# Patient Record
Sex: FEMALE
[De-identification: names, ages, dates, MRNs, and addresses within clinical notes are randomized per-mention and may not be internally consistent; named-entity substitution may affect disease eponyms.]

---

## 2018-09-29 ENCOUNTER — HOSPITAL ENCOUNTER (EMERGENCY)
Dept: HOSPITAL 97 - ER | Age: 33
Discharge: HOME | End: 2018-09-29
Payer: COMMERCIAL

## 2018-09-29 VITALS — TEMPERATURE: 98 F | DIASTOLIC BLOOD PRESSURE: 80 MMHG | OXYGEN SATURATION: 99 % | SYSTOLIC BLOOD PRESSURE: 106 MMHG

## 2018-09-29 DIAGNOSIS — M25.561: Primary | ICD-10-CM

## 2018-09-29 DIAGNOSIS — Z91.048: ICD-10-CM

## 2018-09-29 DIAGNOSIS — Z91.040: ICD-10-CM

## 2018-09-29 PROCEDURE — 99283 EMERGENCY DEPT VISIT LOW MDM: CPT

## 2018-09-29 PROCEDURE — 96372 THER/PROPH/DIAG INJ SC/IM: CPT

## 2018-09-29 NOTE — ER
Nurse's Notes                                                                                     

 [facility]                                                                

Name: Angelita Roach                                                                          

Age: 33 yrs                                                                                       

Sex: Female                                                                                       

: 1985                                                                                   

MRN: [id number]                                                                                   

Arrival Date: 2018                                                                          

Time: 19:17                                                                                       

Account#: [id number]                                                                            

Bed 13                                                                                            

Private MD:                                                                                       

Diagnosis: Pain in right knee                                                                     

                                                                                                  

Presentation:                                                                                     

                                                                                             

19:26 Presenting complaint: Patient states: Right knee pain for 2 weeks. Seen by PCP on       aj  

      Tuesday and had X ray and labs done. Appointment with ortho on 10/11. Transition of         

      care: patient was not received from another setting of care. Onset of symptoms was          

      September 15, 2018. Risk Assessment: Do you want to hurt yourself or someone else?          

      Patient reports no desire to harm self or others. Initial Sepsis Screen: Does the           

      patient meet any 2 criteria? No. Patient's initial sepsis screen is negative. Does the      

      patient have a suspected source of infection? No. Patient's initial sepsis screen is        

      negative. Care prior to arrival: None.                                                      

19:26 Method Of Arrival: Ambulatory                                                             

19:26 Acuity: MARI 4                                                                             

                                                                                                  

Triage Assessment:                                                                                

19:27 General: Appears in no apparent distress. uncomfortable, Behavior is calm, cooperative, aj  

      appropriate for age. Pain: Complains of pain in right knee. Neuro: Level of                 

      Consciousness is awake, alert, obeys commands, Oriented to person, place, time,             

      situation, Appropriate for age. Respiratory: Airway is patent Respiratory effort is         

      even, unlabored, Respiratory pattern is regular, symmetrical. Derm: Skin is intact, is      

      healthy with good turgor, Skin is pink, warm \T\ dry. normal. Musculoskeletal: Reports      

      pain in right knee.                                                                         

                                                                                                  

OB/GYN:                                                                                           

19:27 LMP 2018                                                                              

                                                                                                  

Historical:                                                                                       

- Allergies:                                                                                      

19:27 Latex, Natural Rubber;                                                                  aj  

- PMHx:                                                                                           

19:27 Anxiety; narcolepsy;                                                                    aj  

- PSHx:                                                                                           

19:27 ;                                                                              aj  

                                                                                                  

- Immunization history:: Adult Immunizations up to date.                                          

- Social history:: Smoking status: Patient/guardian denies using tobacco.                         

- Ebola Screening: : Patient negative for fever greater than or equal to 101.5 degrees            

  Fahrenheit, and additional compatible Ebola Virus Disease symptoms Patient denies               

  exposure to infectious person Patient denies travel to an Ebola-affected area in the            

  21 days before illness onset No symptoms or risks identified at this time.                      

                                                                                                  

                                                                                                  

Screenin:50 Abuse screen: Denies threats or abuse. Nutritional screening: No deficits noted.        ea  

      Tuberculosis screening: No symptoms or risk factors identified. Fall Risk None              

      identified.                                                                                 

                                                                                                  

Assessment:                                                                                       

19:50 General: Appears uncomfortable, Behavior is calm, cooperative, appropriate for age.     ea  

      Pain: Complains of pain in right knee Pain currently is 8 out of 10 on a pain scale.        

      Quality of pain is described as aching. Neuro: Level of Consciousness is awake, alert,      

      obeys commands, Oriented to person, place, time, situation. Cardiovascular: Patient's       

      skin is warm and dry. Respiratory: Airway is patent Respiratory effort is even,             

      unlabored, Respiratory pattern is regular, symmetrical. GI: No signs and/or symptoms        

      were reported involving the gastrointestinal system. : No signs and/or symptoms were      

      reported regarding the genitourinary system. Derm: Skin is pink, warm \T\ dry.              

20:50 Reassessment: Patient and/or family updated on plan of care and expected duration. Pain ea  

      level reassessed. Patient is alert, oriented x 3, equal unlabored respirations, skin        

      warm/dry/pink. Discharge instructions given to patient, verbalized the understanding of     

      instruction. Patient states symptoms have improved.                                         

                                                                                                  

Vital Signs:                                                                                      

19:27  / 68; Pulse 87; Resp 19; Temp 98.7; Pulse Ox 100% on R/A; Weight 74.84 kg;       aj  

      Height 5 ft. 2 in. (157.48 cm);                                                             

20:41  / 80; Pulse 75; Resp 18; Temp 98(O); Pulse Ox 99% on R/A;                        ea  

19:27 Body Mass Index 30.18 (74.84 kg, 157.48 cm)                                               

                                                                                                  

ED Course:                                                                                        

19:17 Patient arrived in ED.                                                                  am2 

19:27 Triage completed.                                                                       aj  

19:27 Arm band placed on left wrist. Patient placed in an exam room.                          aj  

19:33 Benja Silverio PA is PHCP.                                                                cp  

19:34 Benja Gilbert MD is Attending Physician.                                             rudy 

19:34 Benja Gilbert MD is Attending Physician.                                             cp  

19:43 Isabella Galvez, RN is Primary Nurse.                                                    ea  

20:32 Patient has correct armband on for positive identification. Bed in low position. Call   ea  

      light in reach. Side rails up X2.                                                           

20:38 Aaron Hicks MD is Referral Physician.                                               cp  

20:41 No provider procedures requiring assistance completed. Patient did not have IV access   ea  

      during this emergency room visit.                                                           

                                                                                                  

Administered Medications:                                                                         

20:00 Drug: TORadol 60 mg Route: IM; Site: right deltoid;                                     ea  

20:41 Follow up: Response: No adverse reaction; Pain is decreased                             ea  

                                                                                                  

                                                                                                  

Outcome:                                                                                          

20:38 Discharge ordered by MD.                                                                cp  

20:51 Discharged to home with crutches, with significant other.                               ea  

20:51 Condition: improved                                                                         

20:51 Discharge instructions given to patient, Instructed on discharge instructions, follow       

      up and referral plans. medication usage, Demonstrated understanding of instructions,        

      follow-up care, medications, Prescriptions given X 2.                                       

20:52 Patient left the ED.                                                                    ea  

                                                                                                  

Signatures:                                                                                       

Sun Meyers, RN                       RN   Benja Aden MD MD cha Page, Corey, PA                         PA   Sun Abdalla                               am2                                                  

Isabella Galvez RN                      RN   mary ann                                                   

                                                                                                  

**************************************************************************************************

## 2018-09-29 NOTE — EDPHYS
Physician Documentation                                                                           

 [facility]                                                                

Name: Angelita Roach                                                                          

Age: 33 yrs                                                                                       

Sex: Female                                                                                       

: 1985                                                                                   

MRN: [id number]                                                                                   

Arrival Date: 2018                                                                          

Time: 19:17                                                                                       

Account#: [id number]                                                                            

Bed 13                                                                                            

Private MD:                                                                                       

ERNESTINA Physician Benja Gilbert                                                                      

HPI:                                                                                              

                                                                                             

19:45 This 33 yrs old  Female presents to ER via Ambulatory with complaints of Knee   cp  

      Pain.                                                                                       

19:45 The patient presents with pain, that is acute.                                          cp  

19:45 The complaints affect the medial aspect of right knee. Context: resulted from an        cp  

      unknown cause, the patient can fully bear weight, the patient is able to ambulate, with     

      moderate difficulty. Onset: The symptoms/episode began/occurred 2 week(s) ago.              

      Associated signs and symptoms: Pertinent negatives calf tenderness, fever, numbness,        

      warmth, shortness of breath. Treatment prior to arrival includes: over the counter          

      medications, NSAIDS. The patient has been recently seen by a physician: Dr. Luis with       

      similar presenting complaints, lab tests were done, X-rays were performed, and was          

      referred to a specialist.                                                                   

                                                                                                  

OB/GYN:                                                                                           

19:27 LMP 2018                                                                            aj  

                                                                                                  

Historical:                                                                                       

- Allergies:                                                                                      

19:27 Latex, Natural Rubber;                                                                  aj  

- PMHx:                                                                                           

19:27 Anxiety; narcolepsy;                                                                    aj  

- PSHx:                                                                                           

19:27 ;                                                                              aj  

                                                                                                  

- Immunization history:: Adult Immunizations up to date.                                          

- Social history:: Smoking status: Patient/guardian denies using tobacco.                         

- Ebola Screening: : Patient negative for fever greater than or equal to 101.5 degrees            

  Fahrenheit, and additional compatible Ebola Virus Disease symptoms Patient denies               

  exposure to infectious person Patient denies travel to an Ebola-affected area in the            

  21 days before illness onset No symptoms or risks identified at this time.                      

                                                                                                  

                                                                                                  

ROS:                                                                                              

19:50 Constitutional: Negative for body aches, chills, fever, poor PO intake.                 cp  

19:50 Eyes: Negative for injury, pain, redness, and discharge.                                cp  

19:50 ENT: Negative for drainage from ear(s), ear pain, sore throat, difficulty swallowing,       

      difficulty handling secretions.                                                             

19:50 Cardiovascular: Negative for chest pain, edema, palpitations.                               

19:50 Respiratory: Negative for cough, shortness of breath, wheezing.                             

19:50 Abdomen/GI: Negative for abdominal pain, nausea, vomiting, and diarrhea.                    

19:50 MS/extremity: Positive for pain, tenderness, of the medial aspect right knee, Negative      

      for injury or acute deformity, decreased range of motion, paresthesias.                     

19:50 Skin: Negative for cellulitis, rash.                                                        

19:50 All other systems are negative.                                                             

                                                                                                  

Exam:                                                                                             

19:57 Constitutional: The patient appears in no acute distress, alert, awake, non-toxic, well cp  

      developed, well nourished.                                                                  

19:57 Head/Face:  Normocephalic, atraumatic.                                                  cp  

19:57 Eyes: Periorbital structures: appear normal, Conjunctiva: normal, no exudate, no            

      injection, Sclera: no appreciated abnormality, Lids and lashes: appear normal,              

      bilaterally.                                                                                

19:57 ENT: External ear(s): are unremarkable, Nose: is normal, Mouth: is normal, Posterior        

      pharynx: is normal, airway is patent.                                                       

19:57 Neck: ROM/movement: is normal, is supple, without pain, no range of motions                 

      limitations, no nuchal rigidity.                                                            

19:57 Chest/axilla: Inspection: normal.                                                           

19:57 Cardiovascular: Rate: normal.                                                               

19:57 Respiratory: the patient does not display signs of respiratory distress,  Respirations:     

      normal, no use of accessory muscles, no retractions, no splinting, no tachypnea,            

      labored breathing, is not present.                                                          

19:57 Abdomen/GI: Exam negative for discomfort, distension, guarding, Inspection: abdomen         

      appears normal.                                                                             

19:57 Back: pain, is absent, ROM is normal.                                                       

19:57 Musculoskeletal/extremity: Extremities: grossly normal except: noted in the medial          

      aspect right knee distal MCL: pain, swelling, tenderness, There is no evidence of           

      decreased ROM.                                                                              

19:57 Skin: cellulitis, is not appreciated, no rash present.                                      

                                                                                                  

Vital Signs:                                                                                      

19:27  / 68; Pulse 87; Resp 19; Temp 98.7; Pulse Ox 100% on R/A; Weight 74.84 kg;       aj  

      Height 5 ft. 2 in. (157.48 cm);                                                             

20:41  / 80; Pulse 75; Resp 18; Temp 98(O); Pulse Ox 99% on R/A;                        ea  

19:27 Body Mass Index 30.18 (74.84 kg, 157.48 cm)                                               

                                                                                                  

Procedures:                                                                                       

20:40 Crutch training provided to patient and/or family. Return demonstration given.          cp  

20:40 Splinting: Splint applied to right knee using knee immobilizer, applied by nurse.       cp  

      Examined by me, post splint application: neurovascular intact, Patient tolerated well.      

                                                                                                  

MDM:                                                                                              

19:34 Patient medically screened.                                                             [facility] 

20:00 Differential diagnosis: dislocation, closed fracture, tendonitis, septic joint,         cp  

      cellulitis, DVT.                                                                            

20:37 Data reviewed: vital signs, nurses notes.                                               cp  

20:37 Refusal of service: The patient/guardian displays adequate decision making capability   cp  

      and despite a detailed discussion of alternatives, benefits, risks, and consequences        

      refuses: US to r/o DVT. ED course: VSS. Recommend RICE and f/u with ortho.                  

                                                                                                  

                                                                                             

19:57 Order name: Knee Immobilizer; Complete Time: 20:13                                      cp  

                                                                                             

19:57 Order name: Crutches; Complete Time: 20:13                                              cp  

                                                                                                  

Administered Medications:                                                                         

20:00 Drug: TORadol 60 mg Route: IM; Site: right deltoid;                                     ea  

20:41 Follow up: Response: No adverse reaction; Pain is decreased                             ea  

                                                                                                  

                                                                                                  

Disposition:                                                                                      

                                                                                             

07:18 Co-signature as Attending Physician, Benja Gilbert MD I agree with the assessment and  [facility] 

      plan of care.                                                                               

                                                                                                  

Disposition:                                                                                      

18 20:38 Discharged to Home. Impression: Pain in right knee.                                

- Condition is Stable.                                                                            

- Discharge Instructions: Knee Immobilizer, Knee Pain.                                            

- Prescriptions for Anaprox  mg Oral Tablet - take 1 tablet by ORAL route every             

  12 hours As needed; 20 tablet. Tramadol 50 mg Oral Tablet - take 1 tablet by ORAL               

  route every 8 hours as needed; 12 tablet.                                                       

- Medication Reconciliation Form, Thank You Letter, Antibiotic Education, Prescription            

  Opioid Use, Work release form form.                                                             

- Follow up: Aaron Hicks MD; When: 2 - 3 days; Reason: Recheck today's complaints.            

- Problem is an ongoing problem.                                                                  

- Symptoms have improved.                                                                         

                                                                                                  

                                                                                                  

                                                                                                  

Signatures:                                                                                       

Sun Meyers RN RN aj Anderson, Corey, MD MD cha Page, Corey, PA PA cp Antunez, Elena, RN RN ea                                                   

                                                                                                  

Corrections: (The following items were deleted from the chart)                                    

                                                                                             

20:52 20:38 2018 20:38 Discharged to Home. Impression: Pain in right knee. Condition is ea  

      Stable. Forms are Medication Reconciliation Form, Thank You Letter, Antibiotic              

      Education, Prescription Opioid Use. Follow up: Aaron Hicks; When: 2 - 3 days; Reason:     

      Recheck today's complaints. Problem is an ongoing problem. Symptoms have improved. cp       

                                                                                                  

**************************************************************************************************

## 2020-07-13 ENCOUNTER — HOSPITAL ENCOUNTER (OUTPATIENT)
Dept: HOSPITAL 97 - ER | Age: 35
Setting detail: OBSERVATION
LOS: 1 days | Discharge: HOME | End: 2020-07-14
Attending: INTERNAL MEDICINE | Admitting: INTERNAL MEDICINE
Payer: COMMERCIAL

## 2020-07-13 VITALS — BODY MASS INDEX: 32.9 KG/M2

## 2020-07-13 DIAGNOSIS — K42.9: ICD-10-CM

## 2020-07-13 DIAGNOSIS — K35.80: Primary | ICD-10-CM

## 2020-07-13 DIAGNOSIS — N83.11: ICD-10-CM

## 2020-07-13 DIAGNOSIS — E83.51: ICD-10-CM

## 2020-07-13 DIAGNOSIS — Z11.59: ICD-10-CM

## 2020-07-13 DIAGNOSIS — D64.9: ICD-10-CM

## 2020-07-13 LAB
ALBUMIN SERPL BCP-MCNC: 3.4 G/DL (ref 3.4–5)
ALP SERPL-CCNC: 111 U/L (ref 45–117)
ALT SERPL W P-5'-P-CCNC: 25 U/L (ref 12–78)
AST SERPL W P-5'-P-CCNC: 14 U/L (ref 15–37)
BUN BLD-MCNC: 10 MG/DL (ref 7–18)
GLUCOSE SERPLBLD-MCNC: 93 MG/DL (ref 74–106)
HCT VFR BLD CALC: 35.9 % (ref 36–45)
LIPASE SERPL-CCNC: 61 U/L (ref 73–393)
LYMPHOCYTES # SPEC AUTO: 1.9 K/UL (ref 0.7–4.9)
PMV BLD: 8.8 FL (ref 7.6–11.3)
POTASSIUM SERPL-SCNC: 4 MMOL/L (ref 3.5–5.1)
RBC # BLD: 4.21 M/UL (ref 3.86–4.86)

## 2020-07-13 PROCEDURE — 36415 COLL VENOUS BLD VENIPUNCTURE: CPT

## 2020-07-13 PROCEDURE — 80076 HEPATIC FUNCTION PANEL: CPT

## 2020-07-13 PROCEDURE — 80048 BASIC METABOLIC PNL TOTAL CA: CPT

## 2020-07-13 PROCEDURE — 83690 ASSAY OF LIPASE: CPT

## 2020-07-13 PROCEDURE — 96375 TX/PRO/DX INJ NEW DRUG ADDON: CPT

## 2020-07-13 PROCEDURE — 81003 URINALYSIS AUTO W/O SCOPE: CPT

## 2020-07-13 PROCEDURE — 88304 TISSUE EXAM BY PATHOLOGIST: CPT

## 2020-07-13 PROCEDURE — 74177 CT ABD & PELVIS W/CONTRAST: CPT

## 2020-07-13 PROCEDURE — 99285 EMERGENCY DEPT VISIT HI MDM: CPT

## 2020-07-13 PROCEDURE — 85025 COMPLETE CBC W/AUTO DIFF WBC: CPT

## 2020-07-13 PROCEDURE — 96365 THER/PROPH/DIAG IV INF INIT: CPT

## 2020-07-13 PROCEDURE — 96361 HYDRATE IV INFUSION ADD-ON: CPT

## 2020-07-13 PROCEDURE — 44970 LAPAROSCOPY APPENDECTOMY: CPT

## 2020-07-13 PROCEDURE — 81025 URINE PREGNANCY TEST: CPT

## 2020-07-13 NOTE — EDPHYS
Physician Documentation                                                                           

 [facility]                                                                 

Name: Angelita Roach                                                                          

Age: 34 yrs                                                                                       

Sex: Female                                                                                       

: 1985                                                                                   

MRN: [id number]                                                                                   

Arrival Date: 2020                                                                          

Time: 18:06                                                                                       

Account#: [id number]                                                                            

Bed 26                                                                                            

Private MD:                                                                                       

ED Physician Roberto Carlos Garcia                                                                      

HPI:                                                                                              

                                                                                             

21:05 This 34 yrs old  Female presents to ER via Ambulatory with complaints of        mh7 

      Abdominal Pain.                                                                             

21:05 The patient presents with abdominal pain in the left lower quadrant. Onset: The         mh7 

      symptoms/episode began/occurred yesterday. The symptoms radiate to the left flank.          

      Associated signs and symptoms: Pertinent positives: nausea and vomiting, Pertinent          

      negatives: anorexia, blood in stools, chest pain, constipation, diarrhea, dysuria,          

      fever, headache, hematuria, palpitations, shortness of breath, vaginal discharge,           

      vomiting blood. The symptoms are described as intermittent, vague, waxing/waning.           

      Modifying factors: The symptoms are alleviated by nothing, the symptoms are aggravated      

      by nothing. Severity of pain: At its worst the pain was moderate today, in the              

      emergency department the pain has improved moderately.                                      

                                                                                                  

OB/GYN:                                                                                           

20:35 LMP N/A - Birth control method                                                          vc  

                                                                                                  

Historical:                                                                                       

- Allergies:                                                                                      

18:34 Latex, Natural Rubber;                                                                  ll1 

- PMHx:                                                                                           

18:34 narcolepsy; Anxiety;                                                                    ll1 

- PSHx:                                                                                           

18:34 ;                                                                              ll1 

                                                                                                  

- Immunization history:: Flu vaccine is up to date.                                               

- Social history:: Smoking status: Patient denies any tobacco usage or history of.                

  Patient/guardian denies using alcohol, street drugs, tobacco products.                          

                                                                                                  

                                                                                                  

ROS:                                                                                              

21:05 Constitutional: Negative for fever, chills, and weight loss, Eyes: Negative for injury, mh7 

      pain, redness, and discharge, ENT: Negative for injury, pain, and discharge, Neck:          

      Negative for injury, pain, and swelling, Cardiovascular: Negative for chest pain,           

      palpitations, and edema, Respiratory: Negative for shortness of breath, cough,              

      wheezing, and pleuritic chest pain, Back: Negative for injury and pain, : Negative        

      for injury, bleeding, discharge, and swelling, MS/Extremity: Negative for injury and        

      deformity, Skin: Negative for injury, rash, and discoloration, Neuro: Negative for          

      headache, weakness, numbness, tingling, and seizure, Psych: Negative for depression,        

      anxiety, suicide ideation, homicidal ideation, and hallucinations, Allergy/Immunology:      

      Negative for hives, rash, and allergies, Endocrine: Negative for neck swelling,             

      polydipsia, polyuria, polyphagia, and marked weight changes, Hematologic/Lymphatic:         

      Negative for swollen nodes, abnormal bleeding, and unusual bruising.                        

                                                                                                  

Exam:                                                                                             

21:05 Constitutional:  This is a well developed, well nourished patient who is awake, alert,  mh7 

      and in no acute distress. Head/Face:  Normocephalic, atraumatic. Neck:  Trachea             

      midline, no thyromegaly or masses palpated, and no cervical lymphadenopathy.  Supple,       

      full range of motion without nuchal rigidity, or vertebral point tenderness.  No            

      Meningismus. Chest/axilla:  Normal chest wall appearance and motion.  Nontender with no     

      deformity.  No lesions are appreciated. Cardiovascular:  Regular rate and rhythm with a     

      normal S1 and S2.  No gallops, murmurs, or rubs.  Normal PMI, no JVD.  No pulse             

      deficits. Respiratory:  Lungs have equal breath sounds bilaterally, clear to                

      auscultation and percussion.  No rales, rhonchi or wheezes noted.  No increased work of     

      breathing, no retractions or nasal flaring.                                                 

21:05 Back:  No spinal tenderness.  No costovertebral tenderness.  Full range of motion.          

      Skin:  Warm, dry with normal turgor.  Normal color with no rashes, no lesions, and no       

      evidence of cellulitis. MS/ Extremity:  Pulses equal, no cyanosis.  Neurovascular           

      intact.  Full, normal range of motion. Neuro:  Awake and alert, GCS 15, oriented to         

      person, place, time, and situation.  Cranial nerves II-XII grossly intact.  Motor           

      strength 5/5 in all extremities.  Sensory grossly intact.  Cerebellar exam normal.          

      Normal gait. Psych:  Awake, alert, with orientation to person, place and time.              

      Behavior, mood, and affect are within normal limits.                                        

21:05 Abdomen/GI: Inspection: abdomen appears normal, Bowel sounds: normal, in all quadrants,     

      Palpation: moderate abdominal tenderness, in the epigastric area and left lower             

      quadrant, Rectal exam: the exam is deferred, because of patient request, Indicators:        

      McBurney's point is not tender, Stephenson's sign is negative, Rovsing's sign is negative,      

      Obturator sign is negative, Psoas sign is negative, Liver: no appreciated palpable          

      abnormalities, Hernia: not appreciated.                                                     

                                                                                                  

Vital Signs:                                                                                      

18:32  / 52; Pulse 95; Resp 17; Temp 98.7; Pulse Ox 100% ; Pain 5/10;                   ll1 

21:00  / 74; Pulse 88; Resp 17; Pulse Ox 100% on R/A;                                   vc  

22:00  / 74; Pulse 81; Resp 16; Pulse Ox 100% on R/A;                                   vc  

23:00  / 70; Pulse 81; Resp 18; Pulse Ox 100% on R/A;                                   vc  

                                                                                                  

MDM:                                                                                              

20:24 Patient medically screened.                                                             [facility] 

22:43 Differential diagnosis: appendicitis, non-specific abd pain, Ureterolithiasis, urinary  mh7 

      tract infection. Data reviewed: vital signs, nurses notes, lab test result(s), CBC,         

      electrolytes, urinalysis. Data interpreted: Cardiac monitor: rate is 95 beats/min,          

      rhythm is normal sinus rhythm, regular, Interpretation: normal rate, normal rhythm,         

      Pulse oximetry: on room air is 100 %. Interpretation: normal. Counseling: I had a           

      detailed discussion with the patient and/or guardian regarding: the historical points,      

      exam findings, and any diagnostic results supporting the discharge/admit diagnosis, lab     

      results, radiology results. Response to treatment: the patient's symptoms have markedly     

      improved after treatment.                                                                   

                                                                                                  

                                                                                             

20:26 Order name: Basic Metabolic Panel; Complete Time: 21:17                                 [facility] 

                                                                                             

20:26 Order name: CBC with Diff; Complete Time: 21:17                                         [facility] 

                                                                                             

20:26 Order name: Hepatic Function; Complete Time: 21:                                      [facility] 

                                                                                             

20:26 Order name: Lipase; Complete Time: 21:17                                                [facility] 

                                                                                             

22:00 Order name: Urine Dipstick--Ancillary (enter results); Complete Time: 22:14             [facility] 

                                                                                             

22:00 Order name: Urine Pregnancy--Ancillary (enter results); Complete Time: 22:14            [facility] 

                                                                                             

21:18 Order name: CT Abd/Pelvis - IV Contrast Only                                            [facility] 

                                                                                             

05:15 Order name: CBC with Automated Diff; Complete Time: 08:05                               [facility]

                                                                                             

05:34 Order name: Basic Metabolic Panel; Complete Time: 08:05                                 [facility]

                                                                                             

05:34 Order name: Lipase; Complete Time: 08:05                                                [facility]

                                                                                             

08:16 Order name: Lipase                                                                      [facility] 

                                                                                             

09:02 Order name: CORONAVIRUS                                                                 [facility]

                                                                                             

09:07 Order name: Lipase                                                                      [facility]

                                                                                             

20:26 Order name: IV Saline Lock; Complete Time: 20:53                                        [facility] 

                                                                                             

20:26 Order name: Labs collected and sent; Complete Time: 20:53                               [facility] 

                                                                                             

20:26 Order name: Urine Dipstick-Ancillary (obtain specimen); Complete Time: 22:06            [facility] 

                                                                                             

20:26 Order name: Urine Pregnancy Test (obtain specimen); Complete Time: 22:06                7 

                                                                                                  

Administered Medications:                                                                         

20:52 Drug: Zofran (Ondansetron) 4 mg Route: IVP; Site: left antecubital;                     vc  

22:30 Follow up: Response: No adverse reaction                                                vc  

20:52 Drug: NS 0.9% 1000 ml Route: IV; Rate: 1000 ml; Site: left antecubital;                 vc  

22:00 Follow up: IV Status: Completed infusion; IV Intake: 1000ml                             vc  

20:52 Drug: Pepcid 20 mg Route: IVP; Site: left antecubital;                                  vc  

22:30 Follow up: Response: No adverse reaction                                                vc  

20:53 Drug: morphine 4 mg Route: IVP; Site: left antecubital;                                 vc  

22:30 Follow up: Response: No adverse reaction                                                vc  

23:10 Drug: Zosyn 3.375 grams Route: IVPB; Infused Over: 60 mins; Site: left antecubital;     vc  

23:30 Follow up: IV Status: Completed infusion; IV Intake: 100ml                              vc  

                                                                                                  

                                                                                                  

Disposition:                                                                                      

20 22:46 Hospitalization ordered by KERVIN Luis for Inpatient Admission. Preliminary            

  diagnosis is Acut Appendicitis.                                                                 

- Bed requested for [facility] ER HOLD.                                                                 

- Status is Inpatient Admission.                                                              ss  

- Condition is Stable.                                                                            

- Problem is new.                                                                                 

- Symptoms have improved.                                                                         

                                                                                                  

                                                                                                  

                                                                                                  

Signatures:                                                                                       

Dispatcher [employer]                           Benja Lynch MD MD cha Smirch, Shelby, RN RN ss Garcia, Cindy, RN RN   cg                                                   

Lucita Lester RN RN vc Lewis, Lynsay, RN RN   ll1                                                  

Roberto Carlos Garcia MD MD   7                                                  

                                                                                                  

Corrections: (The following items were deleted from the chart)                                    

23:08 22:46 Hospitalization Ordered by KERVIN Luis MD for Inpatient Admission. Preliminary         cg  

      diagnosis is Acut Appendicitis. Bed requested for Telemetry/MedSurg (Inpatient). Status     

      is Inpatient Admission. Condition is Stable. Problem is new. Symptoms have improved. mh7    

                                                                                             

09:58  23:08 2020 22:46 Hospitalization Ordered by A Toby COELHO for Inpatient         ss  

      Admission. Preliminary diagnosis is Acut Appendicitis. Bed requested for [facility] ER HOLD.      

      Status is Inpatient Admission. Condition is Stable. Problem is new. Symptoms have           

      improved. cg                                                                                

                                                                                             

10:04 09:58 2020 22:46 Hospitalization Ordered by A Toby COELHO for Inpatient Admission.    ss  

      Preliminary diagnosis is Acut Appendicitis. Bed requested for [facility] ER HOLD. Status is       

      Inpatient Admission. Condition is Stable. Problem is new. Symptoms have improved. ss        

12:14 10:04 2020 22:46 Hospitalization Ordered by A Toby COELHO for Inpatient Admission.    ss  

      Preliminary diagnosis is Acut Appendicitis. Bed requested for [facility] ER HOLD. Status is       

      Inpatient Admission. Condition is Stable. Problem is new. Symptoms have improved. ss        

                                                                                                  

**************************************************************************************************

## 2020-07-13 NOTE — ER
Nurse's Notes                                                                                     

 [facility]                                                                 

Name: Angelita Roach                                                                          

Age: 34 yrs                                                                                       

Sex: Female                                                                                       

: 1985                                                                                   

MRN: [id number]                                                                                   

Arrival Date: 2020                                                                          

Time: 18:06                                                                                       

Account#: [id number]                                                                            

Bed 26                                                                                            

Private MD:                                                                                       

Diagnosis: Acut Appendicitis                                                                      

                                                                                                  

Presentation:                                                                                     

                                                                                             

18:32 Chief complaint: Patient states: Lower abdominal pain since yesterday. 1 vomitus        ll1 

      yesterday, none today. Fever 100 last night. No vaginal bleeding or discharge.              

      Coronavirus screen: Proceed with normal triage. Patient denies a cough. Patient denies      

      shortness of breath or difficulty breathing. Patient reports a measured and/or              

      subjective temperature greater than 100.4F. Patient denies travel on a cruise ship or       

      to a country the [facility] currently lists as an affected area. Patient denies contact with       

      known and/or suspected case of COVID-19. Ebola Screen: Patient denies travel to an          

      Ebola-affected area in the 21 days before illness onset. Initial Sepsis Screen: Does        

      the patient meet any 2 criteria? HR > 90 bpm. No. Patient's initial sepsis screen is        

      negative. Risk Assessment: Do you want to hurt yourself or someone else? Patient            

      reports no desire to harm self or others. Onset of symptoms was 2020.              

18:32 Method Of Arrival: Ambulatory                                                           ll1 

18:32 Acuity: MARI 3                                                                           ll1 

20:00 Initial Sepsis Screen: Does the patient have a suspected source of infection? No.       vc  

      Patient's initial sepsis screen is negative.                                                

                                                                                                  

OB/GYN:                                                                                           

20:35 LMP N/A - Birth control method                                                          vc  

                                                                                                  

Historical:                                                                                       

- Allergies:                                                                                      

18:34 Latex, Natural Rubber;                                                                  ll1 

- PMHx:                                                                                           

18:34 narcolepsy; Anxiety;                                                                    ll1 

- PSHx:                                                                                           

18:34 ;                                                                              ll1 

                                                                                                  

- Immunization history:: Flu vaccine is up to date.                                               

- Social history:: Smoking status: Patient denies any tobacco usage or history of.                

  Patient/guardian denies using alcohol, street drugs, tobacco products.                          

                                                                                                  

                                                                                                  

Screenin:00 Abuse screen: Denies threats or abuse. Nutritional screening: No deficits noted.        vc  

      Tuberculosis screening: No symptoms or risk factors identified. Fall Risk None              

      identified.                                                                                 

                                                                                                  

Assessment:                                                                                       

20:00 General: Appears in no apparent distress. uncomfortable, Behavior is calm, cooperative, vc  

      appropriate for age. Pain: Complains of pain in epigastric area and left lower quadrant     

      Pain radiates to back Pain currently is 5 out of 10 on a pain scale. Quality of pain is     

      described as sharp, shooting, stabbing. Neuro: Level of Consciousness is awake, alert,      

      obeys commands, Oriented to person, place, time, situation, Appropriate for age.            

      Cardiovascular: Capillary refill < 3 seconds Patient's skin is warm and dry.                

      Respiratory: Respiratory effort is even, unlabored, Respiratory pattern is regular,         

      symmetrical. GI: Bowel sounds present X 4 quads. Abd is soft Abdomen is tender to           

      palpation X 4 quads. : No signs and/or symptoms were reported regarding the               

      genitourinary system. Derm: Skin is intact, is healthy with good turgor, Skin               

      temperature is warm. Musculoskeletal: Circulation, motion, and sensation intact. Range      

      of motion: intact in all extremities.                                                       

21:00 Reassessment: Patient appears in no apparent distress at this time. Patient and/or      vc  

      family updated on plan of care and expected duration. Pain level reassessed. Patient is     

      alert, oriented x 3, equal unlabored respirations, skin warm/dry/pink.                      

22:00 Reassessment: Patient appears in no apparent distress at this time. Patient and/or      vc  

      family updated on plan of care and expected duration. Pain level reassessed. Patient is     

      alert, oriented x 3, equal unlabored respirations, skin warm/dry/pink.                      

23:00 Reassessment: Patient appears in no apparent distress at this time. Patient and/or      vc  

      family updated on plan of care and expected duration. Pain level reassessed. Patient is     

      alert, oriented x 3, equal unlabored respirations, skin warm/dry/pink.                      

23:45 Reassessment: Patient appears in no apparent distress at this time. Patient and/or      vc  

      family updated on plan of care and expected duration. Pain level reassessed. Patient is     

      alert, oriented x 3, equal unlabored respirations, skin warm/dry/pink. Please see           

      Scheduling Employee Scheduling Software for further documentation. Patient states symptoms have improved.                  

                                                                                             

09:45 Reassessment: Pt left to go to OR.                                                        

                                                                                                  

Vital Signs:                                                                                      

                                                                                             

18:32  / 52; Pulse 95; Resp 17; Temp 98.7; Pulse Ox 100% ; Pain 5/10;                   ll1 

21:00  / 74; Pulse 88; Resp 17; Pulse Ox 100% on R/A;                                   vc  

22:00  / 74; Pulse 81; Resp 16; Pulse Ox 100% on R/A;                                   vc  

23:00  / 70; Pulse 81; Resp 18; Pulse Ox 100% on R/A;                                   vc  

                                                                                                  

ED Course:                                                                                        

18:06 Patient arrived in ED.                                                                  fj1 

18:34 Triage completed.                                                                       ll1 

18:34 Arm band placed on Patient notified of wait time.                                       ll1 

19:59 Roberto Carlos Garcia MD is Attending Physician.                                             mh7 

20:00 Patient has correct armband on for positive identification. Pulse ox on. NIBP on.       vc  

20:11 Lucita Lester, LATISHA is Primary Nurse.                                                  vc  

21:46 CT Abd/Pelvis - IV Contrast Only In Process Unspecified.                                EDMS

21:50 Urine collected: clean catch specimen, clear, kale colored.                            jp3 

22:45 KERVIN Luis MD is Hospitalizing Provider.                                                  7 

23:45 No provider procedures requiring assistance completed. Patient admitted, IV remains in  vc  

      place.                                                                                      

                                                                                                  

Administered Medications:                                                                         

20:52 Drug: Zofran (Ondansetron) 4 mg Route: IVP; Site: left antecubital;                     vc  

22:30 Follow up: Response: No adverse reaction                                                vc  

20:52 Drug: NS 0.9% 1000 ml Route: IV; Rate: 1000 ml; Site: left antecubital;                 vc  

22:00 Follow up: IV Status: Completed infusion; IV Intake: 1000ml                             vc  

20:52 Drug: Pepcid 20 mg Route: IVP; Site: left antecubital;                                  vc  

22:30 Follow up: Response: No adverse reaction                                                vc  

20:53 Drug: morphine 4 mg Route: IVP; Site: left antecubital;                                 vc  

22:30 Follow up: Response: No adverse reaction                                                vc  

23:10 Drug: Zosyn 3.375 grams Route: IVPB; Infused Over: 60 mins; Site: left antecubital;     vc  

23:30 Follow up: IV Status: Completed infusion; IV Intake: 100ml                              vc  

                                                                                                  

                                                                                                  

Intake:                                                                                           

22:00 IV: 1000ml; Total: 1000ml.                                                              vc  

23:30 IV: 100ml; Total: 1100ml.                                                               vc  

                                                                                                  

Outcome:                                                                                          

22:46 Decision to Hospitalize by Provider.                                                    7 

23:45 Admitted to ER Hold.  Please see [facility] for further documentation.                    vc  

23:45 Condition: good                                                                             

23:45 Instructed on the need for admit.                                                           

                                                                                             

12:14 Patient left the ED.                                                                      

                                                                                                  

Signatures:                                                                                       

Dispatcher [employer]                           EDMabel Patterson, RN                      RN                                                      

Romaine Epps jp3                                                  

Lucita Lester RN                    RN   Ronald Contreras1                                                  

Arianne Mabry RN RN ah Lewis, Lynsay, RN                       RN   ll1                                                  

Roberto Carlos Garcia MD MD   mh7                                                  

                                                                                                  

**************************************************************************************************

## 2020-07-14 VITALS — OXYGEN SATURATION: 100 %

## 2020-07-14 VITALS — TEMPERATURE: 97.9 F

## 2020-07-14 VITALS — SYSTOLIC BLOOD PRESSURE: 118 MMHG | DIASTOLIC BLOOD PRESSURE: 68 MMHG

## 2020-07-14 LAB
BUN BLD-MCNC: 9 MG/DL (ref 7–18)
GLUCOSE SERPLBLD-MCNC: 126 MG/DL (ref 74–106)
HCT VFR BLD CALC: 32.2 % (ref 36–45)
LIPASE SERPL-CCNC: 1744 U/L (ref 73–393)
LYMPHOCYTES # SPEC AUTO: 1.6 K/UL (ref 0.7–4.9)
PMV BLD: 8.8 FL (ref 7.6–11.3)
POTASSIUM SERPL-SCNC: 3.8 MMOL/L (ref 3.5–5.1)
RBC # BLD: 3.76 M/UL (ref 3.86–4.86)

## 2020-07-14 PROCEDURE — 0DTJ4ZZ RESECTION OF APPENDIX, PERCUTANEOUS ENDOSCOPIC APPROACH: ICD-10-PCS

## 2020-07-14 RX ADMIN — HYDROMORPHONE HYDROCHLORIDE ONE MG: 1 INJECTION, SOLUTION INTRAMUSCULAR; INTRAVENOUS; SUBCUTANEOUS at 11:13

## 2020-07-14 RX ADMIN — HYDROMORPHONE HYDROCHLORIDE ONE MG: 1 INJECTION, SOLUTION INTRAMUSCULAR; INTRAVENOUS; SUBCUTANEOUS at 11:55

## 2020-07-14 RX ADMIN — HYDROMORPHONE HYDROCHLORIDE ONE MG: 1 INJECTION, SOLUTION INTRAMUSCULAR; INTRAVENOUS; SUBCUTANEOUS at 11:18

## 2020-07-14 RX ADMIN — HYDROMORPHONE HYDROCHLORIDE ONE MG: 1 INJECTION, SOLUTION INTRAMUSCULAR; INTRAVENOUS; SUBCUTANEOUS at 11:50

## 2020-07-14 NOTE — PREOPCON
Date of Consultation:  07/13/2020



Reason For Consultation:  Abdominal pain.



History Of Present Illness:  The patient is a 34-year-old female, who presents with 2-day history of 
periumbilical abdominal pain and lower abdominal pain associated nausea and vomiting on Sunday, anore
claudio, low-grade temperature on Sunday.  No sore throat, runny nose, cough, headaches, or dizziness.  N
o chest pain.  No diarrhea or constipation.  No blood in her stool.  No dysuria or hematuria.  Last m
enstrual period was 2-3 weeks ago was normal.



Review of Systems:

Otherwise unremarkable.



Past Medical History:  Negative.



Past Surgical History:  Negative.



Allergies:  NO ALLERGY.



Social History:  The patient does not smoke or drink.



Family History:  Significant for diabetes and hypertension.



Physical Examination:

Vital Signs:  Stable.  She is afebrile. 

General:  She is awake, alert, and oriented x3. 

Head and Neck:  Cranial nerves 2 through 12 grossly within normal limits.  No neck masses.  No JVD.  
Throat clear.  Neck is supple. 

Chest:  Clear. 

Heart:  S1 and S2. 

Abdomen:  Soft, nondistended.  Positive bowel sounds.  Positive lower left tenderness and rebound.  N
o rigidity or guarding.  Most tender is just below the umbilicus to the left side. 

Extremities:  Adequately perfused.  Nontender. 

Neuro:  Nonfocal.



Laboratory Data:  White count is normal.  CT of the abdomen and pelvis reviewed with the radiologist.
  The patient does have abnormal location of the cecum at the midline with the appendix tilted toward
 the left side.  There is an acute inflammation and no abscess.  No perforation.  No free air.  There
 is also a 2.2 corpus luteum cyst on the ovary.



Assessment:  Acute appendicitis.



Plan:  Admit, n.p.o., IV fluid, IV antibiotic, to the OR for lap appy possible open.  The patient und
erstands the risks, benefits, alternatives and agrees to procedure.





AS/MODL

DD:  07/14/2020 09:29:31Voice ID:  [id number]

DT:  07/14/2020 10:34:27Report ID:  [id number]

## 2020-07-14 NOTE — OP
Date of Procedure:  07/14/2020



Surgeon:  Sarthak Paz MD



Preoperative Diagnosis:  Acute appendicitis.



Postoperative Diagnosis:  Acute appendicitis.



Procedure Performed:  Laparoscopic appendectomy.



Estimated Blood Loss:  Minimal.



Specimen:  Appendix.



Findings:  As above.



Anesthesia:  General.



Complications:  None.



Disposition:  The patient tolerated the procedure in stable condition, taken to Recovery in good gene
ral condition.



Procedure In Detail:  The patient was brought to the OR and placed in supine position.  General anest
hesia was begun.  The patient was prepped and draped in usual sterile fashion.  Marcaine 0.5% was inf
iltrated locally.  A 15 blade was used to make a 1 cm supraumbilical midline incision.  Subcutaneous 
tissue was divided.  Fascia was identified and divided.  A #1 Vicryl stay suture was placed.  Periton
eal cavity was entered with blunt dissection.  A 12 mm trocar was placed into the peritoneal cavity u
nder direct vision.  Pneumoperitoneum was established.  Then, two 5 mm trocars were placed, one in th
e suprapubic region, one in the left lower quadrant.  Laparoscopy revealed injected dilated appendix 
consistent with early acute appendicitis.  There was some serous fluid in the cul-de-sac, which was a
spirated.  Tubes, ovary, and uterus looked normal.  Remainder of the small bowel and large bowel appe
ared normal.  The gallbladder was slightly distended, liver was within normal limits, and peritoneal 
surfaces were otherwise normal.  Then, Endo-DENNIS stapling device was used to divide the base of the ap
pendix and the mesoappendix, and the appendix was retrieved through the umbilicus via an EndoCatch ba
g.  The cecum was somewhat toward the midline with the appendix leading toward the left side.  Other 
than that, everything else was within normal limits and the appendix was retrieved, sent to Pathology
.  Then, the entire peritoneal cavity was examined.  No evidence of bleeding or bowel injury apprecia
constanza.  All trocars were removed under direct vision.  Stay sutures were tied to each other across the 
fascial defect.  Subcutaneous wounds were irrigated.  Bleeding controlled with cautery.  3-0 chromic 
used to approximate the subcutaneous tissue and close the skin.  Sterile dressing was applied.  The p
atient was awakened and taken to Recovery in good general condition.





AS/MODL

DD:  07/14/2020 10:45:34Voice ID:  [id number]

DT:  07/14/2020 20:17:43Report ID:  [id number]

## 2020-07-15 NOTE — RAD REPORT
EXAM DESCRIPTION:

CT ABDOMEN PELVIS WITH IV CONTRAST

 

CLINICAL HISTORY:  ABD PAIN

 

TECHNIQUE:  Contiguous axial images obtained through the abdomen and pelvis following the uneventful 
administration of IV contrast. Coronal and sagittal reformatted images were provided.

This exam was performed according to our departmental dose-optimization program, which includes autom
ated exposure control, adjustment of the mA and/or kV according to patient size and/or use of iterati
ve reconstruction technique.

 

COMPARISON:  None available for comparison.

 

FINDINGS:  Lung bases: Clear

Liver: 9 mm hypodensity at the right hepatic dome which cannot be fully characterized.

Gallbladder and biliary system: Unremarkable

Pancreas: Unremarkable

Spleen: Unremarkable

Adrenals: Unremarkable

Kidneys: Normal renal cortical enhancement. No calculi. No hydronephrosis.

Bowel: Moderate stool within the proximal large bowel. No obstruction. No appreciable mucosal thicken
ing.

Appendix: The appendix courses into the left abdomen and measures 9 mm in maximum diameter. Subtle in
filtrative changes in the surrounding mesentery.

Urinary bladder: Unremarkable

Reproductive: 2.2 cm right ovarian corpus luteal cyst. The uterus and left ovary are unremarkable as 
visualized.

Lymph nodes: No pathologically enlarged lymph nodes.

Peritoneum: Minimal free fluid in the right paracolic gutter, right adnexal region and cul-de-sac. No
 free air.

Vessels: No abdominal aortic aneurysm.

Abdominal wall: Tiny fat-containing umbilical hernia.

Bones: Intact

 

IMPRESSION:  1.   Findings which may represent early acute appendicitis.

2.   2.2 cm right ovarian corpus luteum cyst. No follow-up imaging is recommended. Reference: J Am Co
ll Radiol 2013;10:675-681

3.   Other findings as above.

THIS REPORT CONTAINS FINDINGS THAT MAY BE CRITICAL TO PATIENT CARE:  The findings were verbally discu
ssed via telephone conference with ADONAY Rucker, on 7/13/2020 10:13 PM CDT. The results were a
cknowledged and understood.

 

Electronically signed by:   Dani Arellano MD   7/13/2020 10:15 PM CDT Workstation: [id number]

 

 

Due to temporary technical issues with the PACS/Fluency reporting system, reports are being signed by
 the in house radiologist without review as a courtesy to ensure prompt reporting. The interpreting r
adiologist is fully responsible for the content of the report.

## 2020-07-15 NOTE — SS
Date of Discharge:  2020



Chief Complaint:  Abdominal pain.



History Of Present Illness:  A 34-year-old female patient, who started to have abdominal pain, fever,
 nausea, vomiting day before yesterday.  The patient reported that her pain was 10/10.  Her pain gets
 worse when she gets up and moves around.  Yesterday, her pain got better, but still it was 5/10 on a
 pain scale.  Denies any constipation or diarrhea.  She called office yesterday and I visited with he
r via tele-visit and I advised her to come to emergency room as I was concerned about some acute surg
ical abdominal problem and she agreed to come to ER.  ER physician was contacted and details were dis
cussed.  Evaluation was requested.  After the patient was evaluated, she was admitted to the hospital
 with acute appendicitis without any complication.  I saw her this morning.  [facility]  Overall, she was feeling better.  IV antibiotic and pain medication were given overnigh
t.



Allergies:  TO LATEX.



Medications:  She does not take any medications at home.  Current medication list reviewed.



Review of Systems:

Constitutional:  As mentioned above. 

GI:  As mentioned above. 



All other systems reviewed and negative.



Past Medical History:  Significant for hyperlipidemia, hemangioma of liver, narcolepsy with cataplexy
, obstructive sleep apnea, and anxiety.



Past Surgical History:  Breast augmentation, , and abdominoplasty.



Family History:  Significant for father and mother with hypertension and diabetes.



Social History:  Negative for smoking and alcohol use.



Physical Examination:

Vital Signs:  Temperature 98, pulse 98, respiratory rate 16, blood pressure 112/73, oxygen saturation
 100%.  Height 5 feet 2 inches, weight 180 pounds. 

General:  Awake, alert, oriented, not in distress. 

HEENT:  Head atraumatic, normocephalic.  Conjunctivae nonerythematous.  Sclerae white.  Mouth, no thr
ush or edema noted.  Ears/Nose, no mass, lesion, discharge noted. 

Neck:  Supple. No JVD, lymph nodes, bruit, thyromegaly noted. 

Lungs:  Bilateral good equal air entry. Clear to auscultation. No rhonchi.  No rales. 

Heart:  Normal heart sounds, no murmur or gallop. 

Abdomen:  Soft, not distended.  Presence of guarding and tenderness in the lower abdomen in midline a
s well as left to the midline.  No rebound tenderness. 

Extremities:  No leg edema.  No calf tenderness. 

Skin:  No rash, ulcer, cellulitis. 

Lymphatics:  No lymph node enlargement in neck, supraclavicular, infraclavicular region. 

Neuro:  No focal neurological deficit. 

Chest:  Unremarkable. 

External Genitalia:  Deferred. 

Rectal:  Deferred.



Laboratory Data:  Yesterday, white count 8.3, hemoglobin 12, platelets 218.  Repeat white count today
 7.9, hemoglobin 10.7, platelets 206.  Initial sodium 139, potassium 4, chloride 106, bicarb 26, BUN 
10, creatinine 0.59, glucose 93.  Liver function tests unremarkable.  Lipase 61.  Repeat lipase this 
morning 1744.  Calcium level this morning 7.6.  Urinalysis negative.  Urine pregnancy test negative. 
 CAT scan of abdomen shows findings consistent with acute early appendicitis without any complication
, 2.2 cm right ovarian corpus luteum cyst, and her liver shows about 9 mm hypodensity lesion.



Hospital Course:  After the patient was admitted to the hospital, she was started on IV fluid, IV ant
ibiotic, pain medications.  Details were discussed with general surgeon, Dr. Paz, who took the sukhjinder
ent to operating room today and performed appendectomy.  After surgery, the patient remained stable. 
 Dr. Paz provided her with prescription for pain medication for p.r.n. use and the patient tolerate
d diet very well and Dr. Paz has released her to go home from surgical point of view.  Medically, s
he is stable for discharge.  The patient will follow up with Dr. Paz next week.



Discharge Medications And Instructions:  The patient to take pain medication as prescribed by Dr. Ben lyn.  Augmentin 875 mg 2 times a day for 1 week was prescribed to her and the patient to follow up wit
h Dr. Paz next week.



Final Diagnoses:  

1.Acute appendicitis.

2.Anemia, unspecified.

3.Hypocalcemia.





JAMES/MODL

DD:  2020 20:42:13Voice ID:  [id number]

DT:  07/15/2020 03:01:19Report ID:  [id number]

## 2020-07-23 ENCOUNTER — HOSPITAL ENCOUNTER (INPATIENT)
Dept: HOSPITAL 97 - ER | Age: 35
LOS: 5 days | Discharge: HOME | DRG: 179 | End: 2020-07-28
Attending: INTERNAL MEDICINE | Admitting: INTERNAL MEDICINE
Payer: COMMERCIAL

## 2020-07-23 VITALS — BODY MASS INDEX: 32 KG/M2

## 2020-07-23 DIAGNOSIS — Z90.49: ICD-10-CM

## 2020-07-23 DIAGNOSIS — R05: ICD-10-CM

## 2020-07-23 DIAGNOSIS — Z91.040: ICD-10-CM

## 2020-07-23 DIAGNOSIS — Z20.828: ICD-10-CM

## 2020-07-23 DIAGNOSIS — R50.9: ICD-10-CM

## 2020-07-23 DIAGNOSIS — E78.5: ICD-10-CM

## 2020-07-23 DIAGNOSIS — E87.6: ICD-10-CM

## 2020-07-23 DIAGNOSIS — D64.9: ICD-10-CM

## 2020-07-23 DIAGNOSIS — E86.9: ICD-10-CM

## 2020-07-23 DIAGNOSIS — J69.0: Primary | ICD-10-CM

## 2020-07-23 PROCEDURE — 87804 INFLUENZA ASSAY W/OPTIC: CPT

## 2020-07-23 PROCEDURE — 99285 EMERGENCY DEPT VISIT HI MDM: CPT

## 2020-07-23 PROCEDURE — 80076 HEPATIC FUNCTION PANEL: CPT

## 2020-07-23 PROCEDURE — 81003 URINALYSIS AUTO W/O SCOPE: CPT

## 2020-07-23 PROCEDURE — 85610 PROTHROMBIN TIME: CPT

## 2020-07-23 PROCEDURE — 81015 MICROSCOPIC EXAM OF URINE: CPT

## 2020-07-23 PROCEDURE — 83735 ASSAY OF MAGNESIUM: CPT

## 2020-07-23 PROCEDURE — 83605 ASSAY OF LACTIC ACID: CPT

## 2020-07-23 PROCEDURE — 74176 CT ABD & PELVIS W/O CONTRAST: CPT

## 2020-07-23 PROCEDURE — 96365 THER/PROPH/DIAG IV INF INIT: CPT

## 2020-07-23 PROCEDURE — 85730 THROMBOPLASTIN TIME PARTIAL: CPT

## 2020-07-23 PROCEDURE — 82550 ASSAY OF CK (CPK): CPT

## 2020-07-23 PROCEDURE — 84145 PROCALCITONIN (PCT): CPT

## 2020-07-23 PROCEDURE — 94640 AIRWAY INHALATION TREATMENT: CPT

## 2020-07-23 PROCEDURE — 87040 BLOOD CULTURE FOR BACTERIA: CPT

## 2020-07-23 PROCEDURE — 85379 FIBRIN DEGRADATION QUANT: CPT

## 2020-07-23 PROCEDURE — 87070 CULTURE OTHR SPECIMN AEROBIC: CPT

## 2020-07-23 PROCEDURE — 80053 COMPREHEN METABOLIC PANEL: CPT

## 2020-07-23 PROCEDURE — 82947 ASSAY GLUCOSE BLOOD QUANT: CPT

## 2020-07-23 PROCEDURE — 71045 X-RAY EXAM CHEST 1 VIEW: CPT

## 2020-07-23 PROCEDURE — 80202 ASSAY OF VANCOMYCIN: CPT

## 2020-07-23 PROCEDURE — 87081 CULTURE SCREEN ONLY: CPT

## 2020-07-23 PROCEDURE — 85025 COMPLETE CBC W/AUTO DIFF WBC: CPT

## 2020-07-23 PROCEDURE — 81025 URINE PREGNANCY TEST: CPT

## 2020-07-23 PROCEDURE — 84484 ASSAY OF TROPONIN QUANT: CPT

## 2020-07-23 PROCEDURE — 93005 ELECTROCARDIOGRAM TRACING: CPT

## 2020-07-23 PROCEDURE — 94760 N-INVAS EAR/PLS OXIMETRY 1: CPT

## 2020-07-23 PROCEDURE — 71250 CT THORAX DX C-: CPT

## 2020-07-23 PROCEDURE — 80048 BASIC METABOLIC PNL TOTAL CA: CPT

## 2020-07-23 PROCEDURE — 36415 COLL VENOUS BLD VENIPUNCTURE: CPT

## 2020-07-23 PROCEDURE — 71046 X-RAY EXAM CHEST 2 VIEWS: CPT

## 2020-07-23 NOTE — XMS REPORT
Continuity of Care Document

                            Created on:2020



Patient:JOE SANCHEZ

Sex:Female

:1985

External Reference #:[id number]





Demographics







                          Address                   [address] [place] [address]

 

                          Home Phone                [phone]

 

                          Mobile Phone              [phone]

 

                          Email Address             DECLINED

 

                          Preferred Language        English

 

                          Marital Status            Unknown

 

                          [place] Affiliation     Unknown

 

                          Race                      Unknown

 

                          Additional Race(s)        Unavailable



                                                    White

 

                          Ethnic Group              Unknown









Author







                          Organization              [facility]

t

 

                          Address                   [address] [place] [address] [address]



                                                    [place] [address]

 

                          Phone                     [phone]









Support







                Name            Relationship    Address         Phone

 

                Doc        Spouse          Unavailable     [phone]









Care Team Providers







                    Name                Role                Phone

 

                    Lab,  Fam P[facility] I     Attending Clinician Unavailable









Problems

This patient has no known problems.



Allergies, Adverse Reactions, Alerts

This patient has no known allergies or adverse reactions.



Medications

This patient has no known medications.



Procedures

This patient has no known procedures.



Encounters







        Start   End     Encounter Admission Attending Care    Care    Encounter 

Source



        Date/Time Date/Time Type    Type    Clinicians Facility Department ID   

   

 

        2020 Laboratory         Lab, [facility]    [phone] [id number]

[id number] 



        10:16:27 10:36:27 Only            Fam [facility] I [employer]  [id number]         



                                                [place] [id number]         



                                                [place] [phone]         



                                                nal     044             



                                                Office                  



                                                Building                 



                                                One                     







Results

This patient has no known results.

## 2020-07-24 LAB
ALBUMIN SERPL BCP-MCNC: 3.4 G/DL (ref 3.4–5)
ALP SERPL-CCNC: 93 U/L (ref 45–117)
ALT SERPL W P-5'-P-CCNC: 24 U/L (ref 12–78)
AST SERPL W P-5'-P-CCNC: 21 U/L (ref 15–37)
BUN BLD-MCNC: 11 MG/DL (ref 7–18)
GLUCOSE SERPLBLD-MCNC: 102 MG/DL (ref 74–106)
HCT VFR BLD CALC: 36.8 % (ref 36–45)
INR BLD: 1.03
LYMPHOCYTES # SPEC AUTO: 1.4 K/UL (ref 0.7–4.9)
PMV BLD: 9.1 FL (ref 7.6–11.3)
POTASSIUM SERPL-SCNC: 3.5 MMOL/L (ref 3.5–5.1)
RBC # BLD: 4.35 M/UL (ref 3.86–4.86)
TROPONIN (EMERG DEPT USE ONLY): < 0.02 NG/ML (ref 0–0.04)
UA COMPLETE W REFLEX CULTURE PNL UR: (no result)

## 2020-07-24 PROCEDURE — 8E0ZXY6 ISOLATION: ICD-10-PCS

## 2020-07-24 RX ADMIN — NICARDIPINE HYDROCHLORIDE PRN MLS: 25 INJECTION INTRAVENOUS at 03:40

## 2020-07-24 RX ADMIN — PIPERACILLIN SODIUM AND TAZOBACTAM SODIUM SCH MLS: 3; .375 INJECTION, POWDER, LYOPHILIZED, FOR SOLUTION INTRAVENOUS at 18:03

## 2020-07-24 RX ADMIN — Medication SCH: at 09:00

## 2020-07-24 RX ADMIN — PIPERACILLIN SODIUM AND TAZOBACTAM SODIUM SCH MLS: 3; .375 INJECTION, POWDER, LYOPHILIZED, FOR SOLUTION INTRAVENOUS at 12:56

## 2020-07-24 RX ADMIN — ENOXAPARIN SODIUM SCH MG: 40 INJECTION SUBCUTANEOUS at 09:41

## 2020-07-24 RX ADMIN — ACETAMINOPHEN PRN MG: 500 TABLET, FILM COATED ORAL at 02:58

## 2020-07-24 RX ADMIN — SODIUM CHLORIDE SCH MLS: 0.9 INJECTION, SOLUTION INTRAVENOUS at 18:03

## 2020-07-24 RX ADMIN — Medication SCH: at 21:00

## 2020-07-24 RX ADMIN — NICARDIPINE HYDROCHLORIDE PRN MLS: 25 INJECTION INTRAVENOUS at 02:58

## 2020-07-24 NOTE — EKG
Test Date:    2020-07-23               Test Time:    23:50:06

Technician:   DIONTE                                     

                                                     

MEASUREMENT RESULTS:                                       

Intervals:                                           

Rate:         89                                     

MO:           184                                    

QRSD:         86                                     

QT:           362                                    

QTc:          440                                    

Axis:                                                

P:            70                                     

MO:           184                                    

QRS:          77                                     

T:            66                                     

                                                     

INTERPRETIVE STATEMENTS:                                       

                                                     

Sinus rhythm with fusion complexes

Otherwise normal ECG

Compared to ECG 11/27/2016 13:12:27

Fusion complex(es) now present



Electronically Signed On 07-24-20 15:24:04 CDT by Car Gould

## 2020-07-24 NOTE — P.CNS
Date of Consult: 20


Reason for Consult: Fever hypotension


Chief Complaint: Fever


History of Present Illness: 





Patient is 34 years of age started having fever last Tuesday prior to that she 

had appendectomy was doing well till a week later she started developing some 

fever denies any pulmonary complaints no cough shortness of breath no abdominal 

complaints no diarrhea felt very weak was treated with steroids corona virus 

test as been repeatedly negative denies any chest pain orthopnea patient has 

been febrile here as not take any medication


Allergies





latex Allergy (Verified 12 12:39)


   Rash


POWDER IN GLOVES Allergy (Uncoded 20 01:14)


   Rash





Home Medications: 








NK [No Home Meds]  20 








- Past Medical/Surgical History


Diabetic: No


-: narcolepsy


-: anxiety


-: 2 


-: Tummy Tuck


-: Breast Augmentation


-: Appendectomy





- Family History


  ** Mother


Medical History: Hypertension, Diabetes





  ** Father


Medical History: Hypertension, Diabetes





- Social History


Smoking Status: Unknown if ever smoked


Alcohol use: No


CD- Drugs: No


Caffeine use: No


Place of Residence: Home





Review of Systems


Unremarkable


General: Weakness





Physical Examination














Temp Pulse Resp BP Pulse Ox


 


 97.8 F   81   20   93/59 L  96 


 


 20 08:00  20 08:00  20 08:00  20 08:00  20 08:00








General: Alert, In no apparent distress, Oriented x3


Neck: Supple


Respiratory: Clear to auscultation bilaterally


Cardiovascular: No edema, Regular rate/rhythm


Gastrointestinal: Normal bowel sounds, Soft and benign


Laboratory Data (last 24 hrs)





20 23:35: WBC 6.8  D, Hgb 12.2, Hct 36.8, Plt Count 180


20 23:35: Sodium 138, Potassium 3.5, BUN 11, Creatinine 0.84, Glucose 102,

Total Bilirubin 0.2, AST 21, ALT 24, Alkaline Phosphatase 93


20 23:35: PT 12.1, INR 1.03, APTT 23.9 L








- Problems


(1) Fever


Current Visit: Yes   Status: Acute   


Plan: 


Patient is 34 years of age has been having fever for the past 2 weeks prior to 

that she had an uncomplicated laparoscopic appendectomy developed fever I after 

that the corona virus test as been repeatedly negative her chest x-rays clear 

doubt corona virus infection agree with CT of the chest abdomen and pelvis labs 

review unremarkable pro calcitonin level is also negative D-dimer is normal 

witnessed fever in the hospital patient usually runs low blood pressure

## 2020-07-24 NOTE — RAD REPORT
EXAM DESCRIPTION:  RAD - Chest Single View - 7/23/2020 11:44 pm

 

CLINICAL HISTORY:  Cough;SOB

 

COMPARISON:  July 21

 

TECHNIQUE:  AP portable chest image was obtained 7/23/2020 11:44 pm .

 

FINDINGS:  Lung volumes are reduced compared to the prior study. Overlying breast soft tissue and tre
ast implants exaggerate lung parenchyma. No acute lung parenchymal finding seen. No failure or volume
 overload. Heart and vasculature are normal. No measurable pleural effusion and no pneumothorax. No a
cute bony abnormality seen. No acute aortic findings suspected.

 

IMPRESSION:  No acute cardiopulmonary process.

## 2020-07-24 NOTE — HP
Date of Admission:  2020



Chief Complaint:  Fever, chills, feeling tired, body ache, cough, and shortness 
of breath.



History Of Present Illness:  This is a 34-year-old female patient, who was 
admitted to the hospital overnight for acute appendicitis problem and this was 
on 2020, she was admitted and on 2020, she had laparoscopic 
appendectomy surgery and she was discharged on that particular day after 
surgery.  Her COVID-19 test, which was done during that hospital admission was 
negative.  One week ago, which is on 2020, she started to have fever, 
so she called and talked to me on 2020 regarding her complaints of 
fever.  She had no other complaints and we advised her to stay in isolation 
thinking about possibility of COVID and she was asked to get repeat COVID-19 
test done, which she had it done through [facility] past weekend on , 
and on Monday of this week, her result came back negative.  I talked to her that
day and she was still not feeling well, still continued to have fever and 
feeling tired.  So, I did talk to Dr. Paz, who saw her next day and the 
patient had outpatient chest x-ray done, blood work, urinalysis and all the 
results came back unremarkable except chest x-ray showed some interstitial 
increased markings raising possibility of viral pneumonia and this was done this
week on Tuesday.  The patient was made aware of those results.  She was started 
on Z pack on Monday, and after we looked at all the results, we started her on 
dexamethasone 6 mg daily for 5 days and so far she has taken 2 doses, 1 on 
Wednesday, 1 on Thursday.  Dr. Paz had evaluated her at office for postop 
visit and he did not have any concerns about any postoperative complication 
causing her fever problem.  Last night, she contacted me and informed me that 
she was feeling worse instead of better.  She was having fever with temperature 
going up to 102.7, having significant generalized weakness and body ache, fever,
chills, dry cough, and feeling tired and short of breath walking around in her 
room.  No vomiting, diarrhea, or urinary complaints.  No loss of sense of smell 
or taste.  She was asked to come to emergency room for evaluation.  ER physician
was notified with details, and after she was evaluated in ER, she was admitted 
to the hospital.  During nighttime, nurse called me few times because her blood 
pressure was low.  She already had received 2 L of IV fluid in the emergency 
room and I gave her another 500 cc of IV fluid bolus because her blood pressure 
was around 82 to 84 systolic.  She was also given 1 dose of dexamethasone 6 mg 
IV.  Early this morning, her COVID-19 test, which was done in emergency room 
last night came back negative, so her isolation was continued.  When I saw her 
this morning, she reported that she was feeling better than yesterday.



Medications:  Takes azithromycin 250 mg p.o. daily and dexamethasone 6 mg p.o. 
daily.



Allergies:  TO LATEX.



Review of Systems:

Constitutional:  As mentioned above. 

Respiratory:  As mentioned above. 



All other systems reviewed and negative.



Past Medical History:  Significant for hyperlipidemia, hemangioma of liver, 
narcolepsy with cataplexy, obstructive sleep apnea, and anxiety.



Past Surgical History:  Breast augmentation, , and abdominoplasty.  
Laparoscopic appendectomy done on 2020.



Family History:  Significant for father and mother with hypertension and 
diabetes.



Social History:  Negative for smoking and alcohol use.



Physical Examination:

Vital Signs:  This morning, temperature was 97.8.  During nighttime, her 
temperature was 100.8.  This morning, pulse 81, respiratory rate 20, blood 
pressure 93/59, oxygen saturation 96% on room air.  Height 5 feet 2 inches, 
weight 175 pounds. 

General:  Awake, alert, oriented, not in distress. 

HEENT:  Head atraumatic, normocephalic.  Conjunctivae nonerythematous.  Sclerae 
white.  Mouth, no thrush or edema noted.  Ears/Nose, no mass, lesion, discharge 
noted. 

Neck:  Supple. No JVD, lymph nodes, bruit, thyromegaly noted. 

Lungs:  Bilateral good equal air entry. Clear to auscultation. No rhonchi.  No 
rales. 

Heart:  Normal heart sounds, no murmur or gallop. 

Abdomen:  Soft, bowel sounds normal. No guarding, rigidity, tenderness, mass, 
hepatosplenomegaly, distention, or bruit noted. 

Extremities:  No leg edema.  No calf tenderness. 

Skin:  No rash, ulcer, cellulitis. 

Lymphatics:  No lymph node enlargement in neck, supraclavicular, infraclavicular
region. 

Neuro:  No focal neurological deficit. 

Chest:  Unremarkable. 

External Genitalia:  Deferred. 

Rectal:  Deferred.



Laboratory Data:  White count 6.8, hemoglobin 12.2, platelets 180.  INR 1.03.  
D-dimer 401.  Sodium 138, potassium 3.5, chloride 104, bicarb 24, BUN 11, 
creatinine 0.84.  Liver function tests unremarkable.  Lactic acid 1.7.  
Procalcitonin less than 0.05.  Urinalysis negative except 3+ blood.  Urine 
pregnancy test negative.  Chest x-ray, no acute cardiopulmonary changes.  
Influenza A and B test negative.  COVID-19 test negative and streptococcal 
screen negative.



Impression:  

1.   Rule out COVID-19.

2.   Fever.

3.   Generalized weakness.

4.   Volume depletion.



Plan:  The patient was admitted to the hospital after evaluation in emergency 
room.  She is appropriate for inpatient and is expected to spend 2 midnights in 
hospital.  We will go ahead and continue IV Levaquin and IV dexamethasone.  
Consult Dr. Curry.  I have contacted him and all the details were discussed 
with him about the patient's multiple negative COVID tests and last week's 
presentation and current symptoms.  She has not required any oxygen supplement 
and maintaining adequate oxygenation on room air.  I have ordered CAT scan of 
the chest, abdomen, and pelvis without contrast and we will review that 
information once available.  Details and plan of treatment discussed with the 
patient and she was informed that even with negative COVID-19 test, my primary 
concern is COVID-19 infection and she understands that.  Her abdominal exam is 
completely normal and 

surgical incision that she had from laparoscopic appendectomy has healed very 
well.





JAMES/LISA

DD:  2020 11:11:59   Voice ID:  [id number]

SHEREEN

## 2020-07-24 NOTE — ER
Nurse's Notes                                                                                     

 [facility]                                                                 

Name: Angelita Roach                                                                          

Age: 34 yrs                                                                                       

Sex: Female                                                                                       

: 1985                                                                                   

MRN: [id number]                                                                                   

Arrival Date: 2020                                                                          

Time: 22:45                                                                                       

Account#: [id number]                                                                            

Bed 20                                                                                            

Private MD:                                                                                       

Diagnosis: Pneumonia due to other specified infectious organisms                                  

                                                                                                  

Presentation:                                                                                     

                                                                                             

22:52 Chief complaint: Patient states: "I had my appendix reproved last Tuesday, ( )   vc  

      and they swabbed me for COVID which came up negative, when I was released Friday I          

      started running a fever, having the chills, and a cough. I was retested  and I        

      was still negative. Today I'm having trouble keeping my fever down. It was 102.7 today,     

      and I still have the chills, and a cough. They did a chest xray when I was here last        

      and it showed I had pneumonia.". Coronavirus screen: Patient reports a cough. Patient       

      denies shortness of breath or difficulty breathing. Patient reports a measured and/or       

      subjective temperature greater than 100.4F. Patient denies travel on a cruise ship or       

      to a country the [facility] currently lists as an affected area. Patient denies contact with       

      known and/or suspected case of COVID-19. Patient instructed to continue to wear a mask      

      when interacting with others. Patient moved to private room, placed in contact and          

      droplet isolation with eye protection until further assessment. Prior COVID test            

      collected on: , and . Ebola Screen: No symptoms or risks identified at      

      this time. Risk Assessment: Do you want to hurt yourself or someone else? Patient           

      reports no desire to harm self or others. Onset of symptoms was 2020.              

22:52 Method Of Arrival: Ambulatory                                                           vc  

22:52 Acuity: MARI 3                                                                           vc  

22:52 Initial Sepsis Screen: Does the patient meet any 2 criteria? Systolic BP < 90 mmHg. No. vc  

      Patient's initial sepsis screen is negative. Does the patient have a suspected source       

      of infection? Yes: Productive cough/pneumonia.                                              

                                                                                                  

Triage Assessment:                                                                                

22:50 General: Appears in no apparent distress. uncomfortable, ill, Behavior is calm,         vc  

      cooperative, appropriate for age. Pain: Denies pain.                                        

                                                                                                  

OB/GYN:                                                                                           

23:02 LMP 2020                                                                            vc  

                                                                                                  

Historical:                                                                                       

- Allergies:                                                                                      

23:01 Latex, Natural Rubber;                                                                  vc  

- PMHx:                                                                                           

23:01 Anxiety; narcolepsy;                                                                    vc  

- PSHx:                                                                                           

23:01 Appendectomy;                                                                           vc  

                                                                                                  

- Immunization history:: Adult Immunizations up to date.                                          

- Social history:: Smoking status: unknown.                                                       

                                                                                                  

                                                                                                  

Screenin:06 Abuse screen: Denies threats or abuse. Nutritional screening: No deficits noted.        vc  

      Tuberculosis screening: No symptoms or risk factors identified. Fall Risk None              

      identified.                                                                                 

                                                                                                  

Assessment:                                                                                       

23:30 General: Appears in no apparent distress. Behavior is calm, cooperative, appropriate    wh  

      for age. Pain: Denies pain. Neuro: Level of Consciousness is awake, alert, obeys            

      commands, Oriented to person, place, time, situation, Appropriate for age.                  

      Cardiovascular: Capillary refill < 3 seconds. Respiratory: Reports cough that is Airway     

      is patent Respiratory effort is even, unlabored, Respiratory pattern is regular,            

      symmetrical. GI: Abdomen is flat, non-distended. : No signs and/or symptoms were          

      reported regarding the genitourinary system. EENT: No signs and/or symptoms were            

      reported regarding the EENT system. Derm: Skin is intact, is healthy with good turgor,      

      Skin is pink, warm \T\ dry. normal. Musculoskeletal: Circulation, motion, and sensation     

      intact.                                                                                     

                                                                                             

00:35 Reassessment: Patient appears in no apparent distress at this time. No changes from       

      previously documented assessment. Patient and/or family updated on plan of care and         

      expected duration. Pain level reassessed. Patient is alert, oriented x 3, equal             

      unlabored respirations, skin warm/dry/pink.                                                 

01:41 Reassessment: Patient appears in no apparent distress at this time. No changes from       

      previously documented assessment. Patient and/or family updated on plan of care and         

      expected duration. Pain level reassessed. Patient is alert, oriented x 3, equal             

      unlabored respirations, skin warm/dry/pink.                                                 

                                                                                                  

Vital Signs:                                                                                      

                                                                                             

22:52 BP 89 / 64; Pulse 87; Resp 18; Temp 99.6(O); Pulse Ox 96% on R/A; Weight 79.38 kg;      vc  

      Height 5 ft. 2 in. (157.48 cm); Pain 0/10;                                                  

                                                                                             

00:06 BP 91 / 61; Pulse 92; Resp 18; Pulse Ox 100% on R/A;                                    wh  

00:30  / 62; Pulse 110; Resp 18; Pulse Ox 100% on R/A;                                    

01:45  / 65; Pulse 104; Resp 18; Temp 99.6; Pulse Ox 100% on R/A;                         

                                                                                             

22:52 Body Mass Index 32.01 (79.38 kg, 157.48 cm)                                             vc  

                                                                                                  

ED Course:                                                                                        

                                                                                             

22:45 Patient arrived in ED.                                                                  cl3 

22:47 Benja Silverio PA is PHCP.                                                                cp  

22:47 Roberto Carlos Garcia MD is Attending Physician.                                             cp  

22:49 Vinny Rm is Primary Nurse.                                                           

23:00 Triage completed.                                                                       vc  

23:30 Inserted saline lock: 22 gauge in left antecubital area, using aseptic technique. Blood wh  

      collected.                                                                                  

23:44 Chest Single View XRAY In Process Unspecified.                                          EDMS

                                                                                             

00:06 Patient has correct armband on for positive identification. Bed in low position. Call     

      light in reach. Side rails up X 1. Cardiac monitor on. Pulse ox on. NIBP on.                

00:06 Arm band placed on right wrist.                                                           

00:43 Gregorio Luis MD is Hospitalizing Provider.                                             cp  

01:44 No provider procedures requiring assistance completed. IV discontinued, intact,           

      bleeding controlled, No redness/swelling at site.                                           

                                                                                                  

Administered Medications:                                                                         

00:01 Drug: NS 0.9% (30 ml/kg) 30 ml/kg Route: IV; Rate: bolus; Site: left antecubital;         

01:20 Follow up: Response: No adverse reaction; IV Status: Completed infusion                   

01:08 Drug: LevaQUIN 750 mg Volume: 150 ml; Route: IVPB; Infused Over: 90 mins; Site: left      

      antecubital;                                                                                

02:13 Follow up: Response: No adverse reaction; IV Status: Completed infusion                   

                                                                                                  

                                                                                                  

Outcome:                                                                                          

00:44 Decision to Hospitalize by Provider.                                                    cp  

01:44 Admitted to Tele accompanied by tech, via stretcher, room 404, with chart, Report         

      called to  Ghanshyam Duque RN                                                                 

01:44 Condition: stable                                                                           

01:44 Instructed on the need for admit.                                                           

02:20 Patient left the ED.                                                                      

                                                                                                  

Signatures:                                                                                       

Dispatcher [employer]                           EDMS                                                 

Benja Silverio PA PA   cp                                                   

Vinny Rm                                                                                   

Colton Back                                cl3                                                  

Lucita Lester RN                    RN   vc                                                   

                                                                                                  

**************************************************************************************************

## 2020-07-24 NOTE — EDPHYS
Physician Documentation                                                                           

 [facility]                                                                 

Name: Angelita Roach                                                                          

Age: 34 yrs                                                                                       

Sex: Female                                                                                       

: 1985                                                                                   

MRN: [id number]                                                                                   

Arrival Date: 2020                                                                          

Time: 22:45                                                                                       

Account#: [id number]                                                                            

Bed 20                                                                                            

Private MD:                                                                                       

ED Physician Roberto Carlos Garcia                                                                      

HPI:                                                                                              

                                                                                             

22:55 This 34 yrs old  Female presents to ER via Ambulatory with complaints of Fever. cp  

22:55 The patient reports fever, 102.7 today.                                                 cp  

22:55 Onset: The symptoms/episode began/occurred last week. Associated signs and symptoms:    cp  

      Pertinent positives: cough, shortness of breath, Pertinent negatives: abdominal pain,       

      diarrhea, headache, vomiting. Severity of symptoms: in the emergency department the         

      symptoms are unchanged despite home interventions. Patient reports having appendectomy      

      performed w/o complications on . Started to have cough and fever this past           

      Friday. Tested negative for COVID-19 twice and is currently taking oral Zithromax           

      prescribed by DR Luis.                                                                      

                                                                                                  

OB/GYN:                                                                                           

23:02 LMP 2020                                                                            vc  

                                                                                                  

Historical:                                                                                       

- Allergies:                                                                                      

23:01 Latex, Natural Rubber;                                                                  vc  

- PMHx:                                                                                           

23:01 Anxiety; narcolepsy;                                                                    vc  

- PSHx:                                                                                           

23:01 Appendectomy;                                                                           vc  

                                                                                                  

- Immunization history:: Adult Immunizations up to date.                                          

- Social history:: Smoking status: unknown.                                                       

                                                                                                  

                                                                                                  

ROS:                                                                                              

23:00 Constitutional: Negative for fever.                                                     cp  

23:00 Eyes: Negative for injury, pain, redness, and discharge.                                cp  

23:00 ENT: Negative for ear pain, difficulty swallowing, difficulty handling secretions.          

23:00 Cardiovascular: Negative for chest pain.                                                    

23:00 Respiratory: Positive for cough, shortness of breath.                                       

23:00 Abdomen/GI: Negative for abdominal pain, nausea, vomiting, and diarrhea.                    

23:00 Skin: Negative for rash.                                                                    

23:00 Neuro: Negative for altered mental status, headache.                                        

23:00 All other systems are negative.                                                             

                                                                                                  

Exam:                                                                                             

23:05 Constitutional: The patient appears in no acute distress, alert, awake,                 cp  

      non-diaphoretic, non-toxic, well developed, well nourished.                                 

23:05 Head/Face:  Normocephalic, atraumatic.                                                  cp  

23:05 Eyes: Periorbital structures: appear normal, Conjunctiva: normal, no exudate, no            

      injection, Sclera: no appreciated abnormality, Lids and lashes: appear normal,              

      bilaterally.                                                                                

23:05 ENT: External ear(s): are unremarkable, Nose: is normal, Mouth: Lips: moist, Oral           

      mucosa: moist, Posterior pharynx: Airway: no evidence of obstruction, patent.               

23:05 Neck: ROM/movement: is normal, is supple, without pain, no range of motions                 

      limitations, no meningismus.                                                                

23:05 Chest/axilla: Inspection: normal, Palpation: is normal, no crepitus, no tenderness.         

23:05 Cardiovascular: Rate: normal, Rhythm: regular, Edema: is not appreciated, JVD: is not       

      appreciated.                                                                                

23:05 Respiratory: the patient does not display signs of respiratory distress,  Respirations:     

      labored breathing, is not present, accessory muscle usage, is absent, intercostal           

      retractions, that is mild, Breath sounds: bronchial sounds, that are mild, are heard        

      diffusely, stridor, is not appreciated, wheezing: is not appreciated.                       

23:05 Abdomen/GI: Inspection: abdomen appears normal, Bowel sounds: active, all quadrants,        

      Palpation: abdomen is soft and non-tender, in all quadrants, rebound tenderness, is not     

      appreciated, involuntary guarding, is not appreciated.                                      

23:05 Back: pain, is absent, ROM is normal.                                                       

23:05 Skin: no rash present.                                                                      

23:05 Neuro: Orientation: to person, place \T\ time. Mentation: is normal.                        

                                                                                             

00:01 ECG was reviewed by the Attending Physician.                                            cp  

                                                                                                  

Vital Signs:                                                                                      

                                                                                             

22:52 BP 89 / 64; Pulse 87; Resp 18; Temp 99.6(O); Pulse Ox 96% on R/A; Weight 79.38 kg;      vc  

      Height 5 ft. 2 in. (157.48 cm); Pain 0/10;                                                  

                                                                                             

00:06 BP 91 / 61; Pulse 92; Resp 18; Pulse Ox 100% on R/A;                                    wh  

00:30  / 62; Pulse 110; Resp 18; Pulse Ox 100% on R/A;                                  wh  

01:45  / 65; Pulse 104; Resp 18; Temp 99.6; Pulse Ox 100% on R/A;                       wh  

                                                                                             

22:52 Body Mass Index 32.01 (79.38 kg, 157.48 cm)                                             vc  

                                                                                                  

MDM:                                                                                              

                                                                                             

22:49 Patient medically screened.                                                             cp  

23:20 Differential diagnosis: viral Infection, bacterial infection, bronchitis, pneumonia     cp  

      UTI, meningitis, COVID-19, pulmonary embolism.                                              

                                                                                             

00:42 Data reviewed: vital signs, nurses notes, lab test result(s), EKG, radiologic studies,  cp  

      plain films, I have discussed the patient's presentation/case with the attending            

      Emergency Department Physician; and as a result, I will admit patient. Test                 

      interpretation: by ED physician or midlevel provider: ECG. Response to treatment: the       

      patient's symptoms have mildly improved after treatment.                                    

                                                                                                  

                                                                                             

22:59 Order name: Ptt, Activated                                                              cp  

                                                                                             

22:59 Order name: Basic Metabolic Panel; Complete Time: 00:43                                 cp  

                                                                                             

00:43 Interpretation: Normal except: GFR 77; CA 8.0.                                          cp  

                                                                                             

22:59 Order name: Blood Culture Adult (2)                                                     cp  

                                                                                             

22:59 Order name: CBC with Diff; Complete Time: 00:29                                         cp  

                                                                                             

00:29 Interpretation: Normal except: WBC 6.8; IZABEL% 74.8.                                      cp  

                                                                                             

22:59 Order name: CPK; Complete Time: 00:43                                                   cp  

                                                                                             

22:59 Order name: Lactate; Complete Time: 00:29                                               cp  

                                                                                             

22:59 Order name: LFT's; Complete Time: 00:43                                                 cp  

                                                                                             

22:59 Order name: Procalcitonin; Complete Time: 04:38                                         cp  

                                                                                             

22:59 Order name: Protime (+inr); Complete Time: 00:41                                        cp  

                                                                                             

22:59 Order name: Troponin (emerg Dept Use Only); Complete Time: 00:43                        cp  

                                                                                             

22:59 Order name: Urine Microscopic Only; Complete Time: 04:38                                cp  

                                                                                             

22:59 Order name: DD; Complete Time: 00:41                                                    cp  

                                                                                             

22:59 Order name: COVID-19                                                                    cp  

                                                                                             

22:59 Order name: Flu; Complete Time: 04:38                                                   cp  

                                                                                             

22:59 Order name: Chest Single View XRAY                                                      cp  

                                                                                             

22:59 Order name: Accucheck; Complete Time: 00:01                                             cp  

                                                                                             

22:59 Order name: Strep; Complete Time: 04:38                                                 cp  

                                                                                             

23:00 Order name: PTT, Activated Partial Thromb; Complete Time: 00:41                         EDMS

                                                                                             

00:05 Order name: Glucose, Ancillary Testing; Complete Time: 00:29                            EDMS

                                                                                             

00:52 Order name: Regular                                                                     EDMS

                                                                                             

00:52 Order name: Basic Metabolic Panel                                                       EDMS

                                                                                             

00:52 Order name: Basic Metabolic Panel                                                       EDMS

                                                                                             

00:52 Order name: CBC with Automated Diff                                                     EDMS

                                                                                             

00:52 Order name: CBC with Automated Diff                                                     EDMS

                                                                                             

01:16 Order name: Urine Pregnancy--Ancillary (enter results)                                  tt3 

                                                                                             

01:16 Order name: Urine Dipstick--Ancillary (enter results)                                   tt3 

                                                                                             

01:33 Order name: Urine Pregnancy--Ancillary; Complete Time: 04:38                            EDMS

                                                                                             

01:33 Order name: Urine Dipstick-Ancillary; Complete Time: 04:38                              EDMS

                                                                                             

22:59 Order name: Cardiac monitoring; Complete Time: 00:01                                    cp  

                                                                                             

22:59 Order name: EKG - Nurse/Tech; Complete Time: 00:01                                      cp  

                                                                                             

22:59 Order name: IV Saline Lock - Large Bore; Complete Time: 00:01                           cp  

                                                                                             

22:59 Order name: Labs collected and sent; Complete Time: 00:01                               cp  

                                                                                             

22:59 Order name: O2 Per Protocol; Complete Time: 00:01                                       cp  

                                                                                             

22:59 Order name: O2 Sat Monitoring; Complete Time: 00:01                                     cp  

                                                                                             

22:59 Order name: Urine Dipstick-Ancillary (obtain specimen); Complete Time: 01:20            cp  

                                                                                             

22:59 Order name: Urine Pregnancy Test (obtain specimen); Complete Time: 01:08                cp  

                                                                                             

22:59 Order name: Document PUI#; Complete Time: 00:01                                         cp  

                                                                                             

22:59 Order name: Droplet/Contact Precautions; Complete Time: 23:35                           cp  

                                                                                             

22:59 Order name: Notify [facility] [phone]; Complete Time: 00:01     cp  

                                                                                                  

EC:01 Rate is 89 beats/min. Rhythm is regular. MT interval is normal. QRS interval is normal. cp  

      QT interval is normal. T waves are Flattened in lead aVL. Interpreted by me. Reviewed       

      by me.                                                                                      

                                                                                                  

Administered Medications:                                                                         

00:01 Drug: NS 0.9% (30 ml/kg) 30 ml/kg Route: IV; Rate: bolus; Site: left antecubital;         

01:20 Follow up: Response: No adverse reaction; IV Status: Completed infusion                   

01:08 Drug: LevaQUIN 750 mg Volume: 150 ml; Route: IVPB; Infused Over: 90 mins; Site: left      

      antecubital;                                                                                

02:13 Follow up: Response: No adverse reaction; IV Status: Completed infusion                   

                                                                                                  

                                                                                                  

Disposition:                                                                                      

04:38 Co-signature as Attending Physician, Roberto Carlos Garcia MD.                                 [id number] 

                                                                                                  

Disposition:                                                                                      

20 00:44 Hospitalization ordered by Gregorio Luis for Inpatient Admission. Preliminary       

  diagnosis is Pneumonia due to other specified infectious organisms.                             

- Bed requested for Telemetry/MedSurg (Inpatient).                                                

- Status is Inpatient Admission.                                                                

- Condition is Stable.                                                                            

- Problem is new.                                                                                 

- Symptoms have improved.                                                                         

                                                                                                  

                                                                                                  

                                                                                                  

Signatures:                                                                                       

Dispatcher [employer]                           EDMS                                                 

Benja Silverio PA PA cp Garcia, Cindy, RN RN                                                      

Vinny Rm                                                                                   

Lucita Lester RN RN                                                      

Roberto Carlos Garcia MD MD   7                                                  

                                                                                                  

Corrections: (The following items were deleted from the chart)                                    

00:43 00:43 Normal except: GFR 77. cp                                                         cp  

01:15 00:44 Hospitalization Ordered by Gregorio Luis MD for Inpatient Admission. Preliminary      

      diagnosis is Pneumonia due to other specified infectious organisms. Bed requested for       

      Telemetry/MedSurg (Inpatient). Status is Inpatient Admission. Condition is Stable.          

      Problem is new. Symptoms have improved. cp                                                  

02:20 01:15 2020 00:44 Hospitalization Ordered by Gregorio Luis MD for Inpatient            

      Admission. Preliminary diagnosis is Pneumonia due to other specified infectious             

      organisms. Bed requested for Telemetry/MedSurg (Inpatient). Status is Inpatient             

      Admission. Condition is Stable. Problem is new. Symptoms have improved. cg                  

11:53 11:51 Data reviewed: vital signs, nurses notes, lab test result(s), EKG, radiologic     cp  

      studies, plain films, I have discussed the patient's presentation/case with the             

      attending Emergency Department Physician; and as a result, I will admit patient, cp         

11:53 11:51 Test interpretation: by ED physician or midlevel provider: ECG, cp                cp  

11:53 11:51 Response to treatment: the patient's symptoms have mildly improved after          cp  

      treatment, cp                                                                               

                                                                                                  

**************************************************************************************************

## 2020-07-24 NOTE — RAD REPORT
EXAM DESCRIPTION:  CT - Chest Abd Pelvis Wo Con - 7/24/2020 10:44 am

 

CLINICAL HISTORY:  fever

 

COMPARISON:  Chest Single View dated 7/23/2020

 

TECHNIQUE:  During dynamic enhancement using 100 milliliters nonionic IV contrast, axial 5 millimeter
 thick images of the chest, abdomen and pelvis were obtained. Biphasic technique was utilized through
 the abdomen.  No oral contrast administered.

 

All CT scans are performed using dose optimization technique as appropriate and may include automated
 exposure control or mA/KV adjustment according to patient size.

 

FINDINGS:  Moderate-sized area of consolidation is present in the posterior right lung base. This is 
an area that is obscured and partially visualized by the liver on a portable examination. Air broncho
grams are present. No cavitation or other complicating factor. Patient has a few additional small juany
und-glass opacities present in the upper lung fields. A small focus is present in the inferior aspect
 of the right upper lobe near the minor fissure and small focus in the posterior mid left lower lobe.
 No pneumothorax or pleural effusion.  No chest wall mass or abnormal axillary lymphadenopathy seen. 
 Mediastinal and hilar regions show no mass or lymphadenopathy.  No significant cardiac finding. Bila
teral breast implants are in place.

 

The liver, spleen and pancreas show no significant findings for noncontrast study.  Gallbladder and b
iliary tree are normal. Gallstones can be occult on CT imaging. No suspicion for an active gallbladde
r process.

 

No hydronephrosis. No obstructing or nonobstructing calculi. No adrenal abnormalities.  No urinary bl
adder abnormalities.  Uterus and ovaries show no suspicious findings. No bladder abnormality.

 

No dilated bowel loops or focal ball bowel wall thickening.  No free air, free fluid or inflammatory 
stranding.  No hernia, mass or bulky lymphadenopathy.

 

No significant bone or vascular finding.

 

IMPRESSION:  Moderate-sized pneumonia in the posterior right lung base. Two minimal foci of additiona
l infiltrates seen in the right upper lobe and left lower lobe, respectively.

 

No cavitation or other complicating factor for the pneumonia. No associated pleural effusion.

 

CT abdomen and pelvis imaging shows no significant or suspicious finding.

## 2020-07-25 LAB
ALBUMIN SERPL BCP-MCNC: 3 G/DL (ref 3.4–5)
ALP SERPL-CCNC: 75 U/L (ref 45–117)
ALT SERPL W P-5'-P-CCNC: 21 U/L (ref 12–78)
AST SERPL W P-5'-P-CCNC: 18 U/L (ref 15–37)
BUN BLD-MCNC: 7 MG/DL (ref 7–18)
BUN BLD-MCNC: 7 MG/DL (ref 7–18)
GLUCOSE SERPLBLD-MCNC: 106 MG/DL (ref 74–106)
GLUCOSE SERPLBLD-MCNC: 109 MG/DL (ref 74–106)
HCT VFR BLD CALC: 30.4 % (ref 36–45)
HCT VFR BLD CALC: 31.9 % (ref 36–45)
LYMPHOCYTES # SPEC AUTO: 1.4 K/UL (ref 0.7–4.9)
LYMPHOCYTES # SPEC AUTO: 1.5 K/UL (ref 0.7–4.9)
MAGNESIUM SERPL-MCNC: 2 MG/DL (ref 1.8–2.4)
PMV BLD: 8.2 FL (ref 7.6–11.3)
PMV BLD: 8.8 FL (ref 7.6–11.3)
POTASSIUM SERPL-SCNC: 3.2 MMOL/L (ref 3.5–5.1)
POTASSIUM SERPL-SCNC: 3.6 MMOL/L (ref 3.5–5.1)
RBC # BLD: 3.62 M/UL (ref 3.86–4.86)
RBC # BLD: 3.84 M/UL (ref 3.86–4.86)

## 2020-07-25 RX ADMIN — PIPERACILLIN SODIUM AND TAZOBACTAM SODIUM SCH MLS: 3; .375 INJECTION, POWDER, LYOPHILIZED, FOR SOLUTION INTRAVENOUS at 00:50

## 2020-07-25 RX ADMIN — ALBUTEROL SULFATE SCH MG: 2.5 SOLUTION RESPIRATORY (INHALATION) at 19:30

## 2020-07-25 RX ADMIN — LEVOFLOXACIN SCH MLS: 5 INJECTION, SOLUTION INTRAVENOUS at 08:47

## 2020-07-25 RX ADMIN — ACETAMINOPHEN PRN MG: 500 TABLET, FILM COATED ORAL at 00:49

## 2020-07-25 RX ADMIN — IBUPROFEN PRN MG: 400 TABLET ORAL at 23:30

## 2020-07-25 RX ADMIN — SODIUM CHLORIDE SCH MLS: 0.9 INJECTION, SOLUTION INTRAVENOUS at 15:48

## 2020-07-25 RX ADMIN — IBUPROFEN PRN MG: 400 TABLET ORAL at 16:49

## 2020-07-25 RX ADMIN — GUAIFENESIN AND DEXTROMETHORPHAN PRN ML: 100; 10 SYRUP ORAL at 20:07

## 2020-07-25 RX ADMIN — ACETAMINOPHEN PRN MG: 500 TABLET, FILM COATED ORAL at 15:55

## 2020-07-25 RX ADMIN — ACETAMINOPHEN PRN MG: 500 TABLET, FILM COATED ORAL at 12:25

## 2020-07-25 RX ADMIN — PIPERACILLIN SODIUM AND TAZOBACTAM SODIUM SCH MLS: 3; .375 INJECTION, POWDER, LYOPHILIZED, FOR SOLUTION INTRAVENOUS at 16:49

## 2020-07-25 RX ADMIN — DOXYCYCLINE SCH MLS: 100 INJECTION, POWDER, LYOPHILIZED, FOR SOLUTION INTRAVENOUS at 20:10

## 2020-07-25 RX ADMIN — GUAIFENESIN AND DEXTROMETHORPHAN PRN ML: 100; 10 SYRUP ORAL at 14:13

## 2020-07-25 RX ADMIN — PIPERACILLIN SODIUM AND TAZOBACTAM SODIUM SCH MLS: 3; .375 INJECTION, POWDER, LYOPHILIZED, FOR SOLUTION INTRAVENOUS at 08:54

## 2020-07-25 RX ADMIN — ENOXAPARIN SODIUM SCH MG: 40 INJECTION SUBCUTANEOUS at 08:52

## 2020-07-25 RX ADMIN — Medication SCH ML: at 20:16

## 2020-07-25 RX ADMIN — Medication SCH: at 08:31

## 2020-07-25 RX ADMIN — SODIUM CHLORIDE SCH: 0.9 INJECTION, SOLUTION INTRAVENOUS at 07:10

## 2020-07-25 RX ADMIN — ACETAMINOPHEN PRN MG: 500 TABLET, FILM COATED ORAL at 07:49

## 2020-07-25 NOTE — RAD REPORT
EXAM DESCRIPTION:  RAD - Chest Pa And Lat (2 Views) - 7/25/2020 6:18 pm

 

CLINICAL HISTORY:  pneumonia, history of multiple negative COVID tests

 

COMPARISON:  CT chest July 24, portable chest July 23

 

TECHNIQUE:  Frontal and lateral views of the chest were obtained.

 

FINDINGS:  The lungs are underinflated. Small patchy pneumonia seen in the upper lung fields, detaile
d on the CT chest, are nonprogressive. Posterior medial right lung base pneumonia is mostly concealed
 of the right hemidiaphragm. No suspicion for enlargement. No pulmonary edema has developed.

 

Heart size is normal and central vasculature is within normal limits.  No pleural effusion or pneumot
horax seen.  No acute bony finding noted.  No aortic abnormality.

 

IMPRESSION:  Pneumonia changes are present not substantially different from prior imaging.

 

No volume overload or pulmonary edema findings.

## 2020-07-25 NOTE — PN
Date of Progress Note:  07/25/2020



Subjective:  The patient was seen this morning for followup.  She still has fever as of this morning.
  When I saw her this morning, temperature had started to go up to 101.6.  Overnight, her temperature
 was very good.  During early morning hours, it was 99.2, but just before I saw her this morning, tem
perature had gone up to 101.6.  Besides dry cough, she does not have any other new complaints.  No vo
miting.  No diarrhea.  Appetite is still poor.  She is able to go to the bathroom and come back witho
ut getting short of breath.



Objective:  Vital Signs:  Reviewed. 

HEENT:  Unremarkable. 

Lungs:  Clear to auscultation on the left side, right side rales present in lower 1/4th lung region. 
 Not in any respiratory distress. 

Heart Sounds:  Normal. 

Abdomen:  Soft.  Bowel sounds normal.  No guarding, rigidity, tenderness, or distention. 

Extremities:  No leg edema.



Impression:  Pneumonia, likely aspiration pneumonia.



Plan:  After I saw the patient this morning, we continued her Zosyn and as of this morning, we added 
Levaquin 750 mg IV piggyback daily.  During later part of the day today, her temperature had started 
to go up, 101.9 and 101.8 and this was in spite of using Tylenol and Motrin, so I have repeated some 
blood work for her.  Cough medication was ordered today and nebulizer treatment was ordered.  CBC thi
s evening; white count 7.3, hemoglobin 10.7, platelets 176, and her lactic acid level 1.2.  Procalcit
onin less than 0.05.  Chemistry unremarkable, except potassium 3.2.  Chest x-ray pending.  We will co
ntinue current medication, antibiotics, Levaquin and Zosyn.  I have discussed details with Dr. Melia guadarrama this evening about the patient's temperature spikes 

and doxycycline was added.  I will see her tomorrow for followup, replace potassium per order.





JAMES/MODL

DD:  07/25/2020 19:34:26Voice ID:  [id number]

DT:  07/25/2020 20:16:13Report ID:  [id number]

## 2020-07-26 LAB
BUN BLD-MCNC: 8 MG/DL (ref 7–18)
GLUCOSE SERPLBLD-MCNC: 93 MG/DL (ref 74–106)
HCT VFR BLD CALC: 31.8 % (ref 36–45)
LYMPHOCYTES # SPEC AUTO: 1.4 K/UL (ref 0.7–4.9)
MAGNESIUM SERPL-MCNC: 2.2 MG/DL (ref 1.8–2.4)
PMV BLD: 8.5 FL (ref 7.6–11.3)
POTASSIUM SERPL-SCNC: 3.9 MMOL/L (ref 3.5–5.1)
RBC # BLD: 3.77 M/UL (ref 3.86–4.86)

## 2020-07-26 RX ADMIN — LEVOFLOXACIN SCH MLS: 5 INJECTION, SOLUTION INTRAVENOUS at 08:12

## 2020-07-26 RX ADMIN — PIPERACILLIN SODIUM AND TAZOBACTAM SODIUM SCH MLS: 3; .375 INJECTION, POWDER, LYOPHILIZED, FOR SOLUTION INTRAVENOUS at 08:15

## 2020-07-26 RX ADMIN — Medication SCH ML: at 08:15

## 2020-07-26 RX ADMIN — SODIUM CHLORIDE SCH MLS: 9 INJECTION, SOLUTION INTRAVENOUS at 20:43

## 2020-07-26 RX ADMIN — ALBUTEROL SULFATE SCH: 2.5 SOLUTION RESPIRATORY (INHALATION) at 08:00

## 2020-07-26 RX ADMIN — SODIUM CHLORIDE SCH MLS: 0.9 INJECTION, SOLUTION INTRAVENOUS at 08:12

## 2020-07-26 RX ADMIN — ACETAMINOPHEN PRN MG: 500 TABLET, FILM COATED ORAL at 16:33

## 2020-07-26 RX ADMIN — IBUPROFEN PRN MG: 400 TABLET ORAL at 08:11

## 2020-07-26 RX ADMIN — SODIUM CHLORIDE SCH MLS: 9 INJECTION, SOLUTION INTRAVENOUS at 12:30

## 2020-07-26 RX ADMIN — PIPERACILLIN SODIUM AND TAZOBACTAM SODIUM SCH MLS: 3; .375 INJECTION, POWDER, LYOPHILIZED, FOR SOLUTION INTRAVENOUS at 00:48

## 2020-07-26 RX ADMIN — PIPERACILLIN SODIUM AND TAZOBACTAM SODIUM SCH: 3; .375 INJECTION, POWDER, LYOPHILIZED, FOR SOLUTION INTRAVENOUS at 09:00

## 2020-07-26 RX ADMIN — ALPRAZOLAM SCH MG: 0.25 TABLET ORAL at 20:43

## 2020-07-26 RX ADMIN — SODIUM CHLORIDE SCH MLS: 9 INJECTION, SOLUTION INTRAVENOUS at 23:00

## 2020-07-26 RX ADMIN — DEXTROSE AND SODIUM CHLORIDE SCH MLS: 5; .45 INJECTION, SOLUTION INTRAVENOUS at 15:28

## 2020-07-26 RX ADMIN — ALBUTEROL SULFATE SCH: 2.5 SOLUTION RESPIRATORY (INHALATION) at 20:00

## 2020-07-26 RX ADMIN — ALBUTEROL SULFATE SCH MG: 2.5 SOLUTION RESPIRATORY (INHALATION) at 01:40

## 2020-07-26 RX ADMIN — ALBUTEROL SULFATE SCH: 2.5 SOLUTION RESPIRATORY (INHALATION) at 14:00

## 2020-07-26 RX ADMIN — ENOXAPARIN SODIUM SCH MG: 40 INJECTION SUBCUTANEOUS at 08:15

## 2020-07-26 RX ADMIN — ACETAMINOPHEN PRN MG: 500 TABLET, FILM COATED ORAL at 00:57

## 2020-07-26 RX ADMIN — IBUPROFEN PRN MG: 400 TABLET ORAL at 15:01

## 2020-07-26 RX ADMIN — SODIUM CHLORIDE SCH MLS: 9 INJECTION, SOLUTION INTRAVENOUS at 16:33

## 2020-07-26 RX ADMIN — ACETAMINOPHEN PRN MG: 500 TABLET, FILM COATED ORAL at 12:00

## 2020-07-26 RX ADMIN — GUAIFENESIN AND DEXTROMETHORPHAN PRN ML: 100; 10 SYRUP ORAL at 20:43

## 2020-07-26 RX ADMIN — DOXYCYCLINE SCH MLS: 100 INJECTION, POWDER, LYOPHILIZED, FOR SOLUTION INTRAVENOUS at 09:00

## 2020-07-26 RX ADMIN — ACETAMINOPHEN PRN MG: 500 TABLET, FILM COATED ORAL at 08:11

## 2020-07-26 RX ADMIN — Medication SCH: at 20:48

## 2020-07-26 NOTE — P.PN
Subjective


Date of Service: 07/26/20


Chief Complaint: Fever





Patient is still running a fever she had fever all day yesterday by broad-

spectrum antibiotics negative blood cultures





Review of Systems


General: Weakness


Respiratory: Shortness of Breath





Physical Examination





- Vital Signs


Temperature: 99 F


Blood Pressure: 116/63


Pulse: 105


Respirations: 20


Pulse Ox (%): 97





- Physical Exam


General: Alert, Mild distress


Respiratory: Clear to auscultation bilaterally


Cardiovascular: No edema, Regular rate/rhythm





- Studies


Microbiology Data (last 24 hrs): 








07/23/20 23:35   Throat   Culture & Sensitivity - Final


                            NORMAL UPPER RESPIRATORY BERNARD GROWN.








Assessment & Plan





- Problems (Diagnosis)


(1) Fever


Current Visit: Yes   Status: Acute   


Plan: 


Patient admitted with right lower lobe pneumonia corona virus is negative she 

may have a bronchiolitis obliterans reaction the recommend trial of prednisone 

patient's antibiotics were changed to meropenem and l vancomycin labs reviewed 

white count is normal if she remains a fever all but tomorrow recommend 

discharge home on prednisone 10 mg twice a day for at least 10 days in addition 

to Augmentin and possibly doxycycline


Qualifiers: 


   Fever type: unspecified   Qualified Code(s): R50.9 - Fever, unspecified

## 2020-07-26 NOTE — PN
Date of Progress Note:  07/26/2020



Subjective:  The patient was seen this morning for followup.  I talked to her yesterday evening when 
her temperature had gone up and I also talked to Dr. Curry and we added doxycycline.  Overnight, h
er temperature came down between  degrees Fahrenheit.  This morning just before I walked in, he
r temperature had gone up to 102 degrees Fahrenheit.  When I saw her, she was feeling really bad moiz
use of fever, chills and body ache that she starts to have along with the fever and chills.  No nause
a.  No vomiting.



Objective:  HEENT:  Otherwise unremarkable. 

Lungs:  Some rales noted in right lower lung field, unchanged from yesterday.  Not using any accessor
y muscles of respiration. 

Heart:  Sounds normal. 

Abdomen:  Soft.  Bowel sounds normal.  No guarding, rigidity, tenderness, or distention. 

Extremities:  No leg edema.



Laboratory Data:  White count 8.5, hemoglobin 10.7, platelets 174.  Sodium 144, potassium 3.9, chlori
de 114, bicarb 22, BUN 8, creatinine 0.60, glucose 93, magnesium 2.2.



Impression:  

1.Pneumonia.

2.Hypokalemia. 

Yesterday's chest x-ray results show area of pneumonia unchanged compared to CT scan.  Details were d
iscussed with the patient.  When I saw her, her  was on the phone available and we talked abou
t changing antibiotics.  I will discontinue her Levaquin, Zosyn, and doxycycline and we will start he
r on meropenem and vancomycin.  So far, cultures are negative.  Continue Tylenol and Motrin, Lovenox 
for DVT prophylaxis.  Continue IV fluid per order and details were discussed with Dr. Curry.  We w
ill follow up on her today as well.  The patient's  wanted to come to hospital and be with her
 to help her with her care and needs and after obtaining permission, we 

have notified the patient.  Her  will be allowed to assist with her.





JAMES/MODL

DD:  07/26/2020 10:35:15Voice ID:  [id number]

DT:  07/26/2020 13:54:06Report ID:  [id number]

## 2020-07-27 RX ADMIN — ALBUTEROL SULFATE SCH: 2.5 SOLUTION RESPIRATORY (INHALATION) at 01:29

## 2020-07-27 RX ADMIN — GUAIFENESIN AND DEXTROMETHORPHAN PRN ML: 100; 10 SYRUP ORAL at 10:04

## 2020-07-27 RX ADMIN — ENOXAPARIN SODIUM SCH MG: 40 INJECTION SUBCUTANEOUS at 08:31

## 2020-07-27 RX ADMIN — DEXTROSE AND SODIUM CHLORIDE SCH MLS: 5; .45 INJECTION, SOLUTION INTRAVENOUS at 03:57

## 2020-07-27 RX ADMIN — SODIUM CHLORIDE SCH MLS: 9 INJECTION, SOLUTION INTRAVENOUS at 20:15

## 2020-07-27 RX ADMIN — Medication SCH: at 08:31

## 2020-07-27 RX ADMIN — ALBUTEROL SULFATE SCH: 2.5 SOLUTION RESPIRATORY (INHALATION) at 08:00

## 2020-07-27 RX ADMIN — ALBUTEROL SULFATE SCH: 2.5 SOLUTION RESPIRATORY (INHALATION) at 20:00

## 2020-07-27 RX ADMIN — SODIUM CHLORIDE SCH MLS: 9 INJECTION, SOLUTION INTRAVENOUS at 08:31

## 2020-07-27 RX ADMIN — SODIUM CHLORIDE SCH MLS: 9 INJECTION, SOLUTION INTRAVENOUS at 11:49

## 2020-07-27 RX ADMIN — ALBUTEROL SULFATE SCH: 2.5 SOLUTION RESPIRATORY (INHALATION) at 14:00

## 2020-07-27 RX ADMIN — ALPRAZOLAM SCH MG: 0.25 TABLET ORAL at 20:15

## 2020-07-27 RX ADMIN — GUAIFENESIN AND DEXTROMETHORPHAN PRN ML: 100; 10 SYRUP ORAL at 18:07

## 2020-07-27 NOTE — P.PN
Subjective


Date of Service: 07/27/20


Chief Complaint: Pneumonia


Patient is feeling much better today no chest pain shortness of breath no fever 

recently





Review of Systems


General: Weakness





Physical Examination





- Vital Signs


Temperature: 97.4 F


Blood Pressure: 132/61


Pulse: 58


Respirations: 20


Pulse Ox (%): 97





- Physical Exam


General: Alert, In no apparent distress, Oriented x3


Respiratory: Clear to auscultation bilaterally


Cardiovascular: No edema, Normal pulses





- Studies


Microbiology Data (last 24 hrs): 








07/23/20 23:35   Throat   Culture & Sensitivity - Final


                            NORMAL UPPER RESPIRATORY BERNARD GROWN.








Assessment & Plan





- Problems (Diagnosis)


(1) Fever


Current Visit: Yes   Status: Acute   


Plan: 


Patient admitted with right lower lobe pneumonia she is currently doing much 

better continue with steroids plan to discharge home on prednisone 10 mg twice a

day for a week also recommend p.o. levofloxacin and doxycycline for 7 days 

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


Qualifiers: 


   Fever type: unspecified   Qualified Code(s): R50.9 - Fever, unspecified

## 2020-07-28 VITALS — OXYGEN SATURATION: 97 %

## 2020-07-28 VITALS — SYSTOLIC BLOOD PRESSURE: 118 MMHG | DIASTOLIC BLOOD PRESSURE: 70 MMHG | TEMPERATURE: 97.4 F

## 2020-07-28 RX ADMIN — ALBUTEROL SULFATE SCH: 2.5 SOLUTION RESPIRATORY (INHALATION) at 08:00

## 2020-07-28 RX ADMIN — ENOXAPARIN SODIUM SCH: 40 INJECTION SUBCUTANEOUS at 08:17

## 2020-07-28 RX ADMIN — Medication SCH ML: at 08:19

## 2020-07-28 RX ADMIN — ALBUTEROL SULFATE SCH: 2.5 SOLUTION RESPIRATORY (INHALATION) at 02:00

## 2020-07-28 RX ADMIN — SODIUM CHLORIDE SCH MLS: 9 INJECTION, SOLUTION INTRAVENOUS at 08:15

## 2020-07-28 RX ADMIN — Medication SCH ML: at 00:12

## 2020-07-28 RX ADMIN — SODIUM CHLORIDE SCH MLS: 9 INJECTION, SOLUTION INTRAVENOUS at 00:11

## 2020-07-28 NOTE — PN
Date of Progress Note:  07/27/2020



Subjective:  The patient was seen this morning for followup.  She was lying in bed.  Her  was 
with her at bedside.  She is feeling a lot better since her temperature came down after we change ant
ibiotics yesterday and Dr. Curry added some prednisone.  Currently, she is on meropenem and vancom
ycin.  Overnight, she remained afebrile and feeling much better this morning.  Denies any complaints.




Objective:  Vital Signs:  Reviewed. 

HEENT:  Unremarkable. 

Lungs:  Some rales noted in right basal region, overall much better than before.  Not in respiratory 
distress. 

Heart:  Heart sounds normal. 

Abdomen:  Soft.  Bowel sounds normal.  No guarding, rigidity, tenderness, or distention. 

Extremities:  No leg edema.



Impression:  

1.Aspiration pneumonia.

2.Hypokalemia.



Plan:  We will go ahead and continue current medications.  Continue current antibiotic, which is lisbeth
penem and vancomycin IV for another 24 hours.  I will see her tomorrow for followup.  If she continue
s to feel well and remains afebrile, then we will plan to discharge her to go home tomorrow with oral
 antibiotics.  Details were discussed with her.





JAMES/MODL

DD:  07/28/2020 06:39:18Voice ID:  [id number]

DT:  07/28/2020 07:39:36Report ID:  [id number]

## 2020-07-29 NOTE — DS
Date of Discharge:  07/28/2020



Disposition:  Discharged to go home.



Physical Examination:

HEENT:  Unremarkable. 

Lungs:  Bilateral good equal air entry, not in respiratory distress.  Minimum 
basal rales in the right lung base present. 

Heart:  Sounds normal. 

Abdomen:  Soft.  Bowel sounds normal.  No guarding, rigidity, tenderness, or 
distention. 

Extremities:  No leg edema.



Discharge Diagnoses:  

1.   Aspiration pneumonia.

2.   Anemia.

3.   Hypokalemia.



Discharge Medications And Instructions:  

1.   Levaquin 500 mg daily for 1 week, doxycycline 100 mg twice a day for 1 
week, prednisone 10 mg tablet, the patient to take 2 tablets daily for 4 days, 
then 1 tab daily for 4 days, then 1/2 tablet daily for 4 days, then stop.

2.   Follow up at my office in 1 week.



Labs Done During This Hospitalization:  On 07/23/2020, white count 6.8, 
hemoglobin 12.2, platelets 180.  Last CBC from 07/26, white count 8.5, 
hemoglobin 10.7, platelets 174.  Last chemistry from 07/26, sodium 144, 
potassium 3.9, chloride 114, bicarb 22, BUN 8, creatinine 0.60, glucose 93.  
Procalcitonin less than 0.05.  Lowest potassium was 3.2 on 07/25/2020.



Hospital Course:  This is a 34-year-old pleasant female patient, who was 
admitted to the hospital with fever, shortness of breath, and cough.  Please see
dictated H and P for more information.  The patient was evaluated in the ER, was
admitted to the hospital.  Chest x-ray did not reveal any acute changes.  Her 
COVID-19 test, which was done in the emergency room, came back negative.  She 
was started on empiric antibiotics and initially, we started her on Zosyn and 
the patient did not respond to that very well.  So, we added Levaquin and she 
still continued to have fever and chills off and on, so Dr. Curry was 
consulted.  Her CAT scan of the chest, abdomen, pelvis done.  Abdomen and pelvis
were unremarkable.  Chest showed multiple areas of pneumonia involving right 
upper lobe and bilateral lung bases.  We definitely started having concern about
aspiration pneumonia as one week ago, the patient had appendectomy surgery done 
and about 2-3 days after the surgery, she started to have these fever and 
chills, cough and shortness of breath type of problem.  Her chest x-ray 
initially did show pneumonia type of finding was noted.  Considering her not 
responding well to Zosyn, Levaquin, doxycycline, and having really bad episodes 
of fever with temperature spiking between 102 to 103, having bad chills, we 
discontinued all those antibiotics and started her on meropenem and vancomycin. 
Her response was significant and she became afebrile after this 2 antibiotics 
and Dr. Curry added some prednisone.  Overall, her condition has improved 
significantly and now almost in 48 hours, she has no fever and today she was 
discharged to go home in stable condition with above-mentioned medications and 
instructions.





JAMES/MODL

DD:  07/28/2020 20:25:50   Voice ID:  [id number]

DT:  07/29/2020 04:01:14   Report ID:  [id number]

SHEREEN